# Patient Record
Sex: MALE | Race: WHITE | NOT HISPANIC OR LATINO | Employment: PART TIME | URBAN - METROPOLITAN AREA
[De-identification: names, ages, dates, MRNs, and addresses within clinical notes are randomized per-mention and may not be internally consistent; named-entity substitution may affect disease eponyms.]

---

## 2020-10-26 ENCOUNTER — TELEPHONE (OUTPATIENT)
Dept: ENDOCRINOLOGY | Facility: CLINIC | Age: 65
End: 2020-10-26

## 2020-10-26 NOTE — TELEPHONE ENCOUNTER
Returned call-he doesn't need to fast before appt tomorrow according to note in centricity.  He had labs with his pcp in march

## 2020-10-26 NOTE — TELEPHONE ENCOUNTER
Pt has an appointment tomorrow and wanted to know if he needed to fast before his appt He ask that someone reach out to him [phone]

## 2020-10-27 ENCOUNTER — OFFICE VISIT (OUTPATIENT)
Dept: ENDOCRINOLOGY | Facility: CLINIC | Age: 65
End: 2020-10-27

## 2020-10-27 VITALS
BODY MASS INDEX: 24.47 KG/M2 | HEIGHT: 71 IN | SYSTOLIC BLOOD PRESSURE: 100 MMHG | DIASTOLIC BLOOD PRESSURE: 70 MMHG | HEART RATE: 72 BPM | WEIGHT: 174.8 LBS

## 2020-10-27 DIAGNOSIS — E11.9 TYPE 2 DIABETES MELLITUS WITHOUT COMPLICATION, WITH LONG-TERM CURRENT USE OF INSULIN (HCC): Primary | ICD-10-CM

## 2020-10-27 DIAGNOSIS — E78.00 PURE HYPERCHOLESTEROLEMIA: ICD-10-CM

## 2020-10-27 DIAGNOSIS — Z79.4 TYPE 2 DIABETES MELLITUS WITHOUT COMPLICATION, WITH LONG-TERM CURRENT USE OF INSULIN (HCC): Primary | ICD-10-CM

## 2020-10-27 LAB
EXPIRATION DATE: NORMAL
HBA1C MFR BLD: 5.4 %
Lab: NORMAL

## 2020-10-27 PROCEDURE — 83036 HEMOGLOBIN GLYCOSYLATED A1C: CPT | Performed by: PHYSICIAN ASSISTANT

## 2020-10-27 PROCEDURE — 99213 OFFICE O/P EST LOW 20 MIN: CPT | Performed by: PHYSICIAN ASSISTANT

## 2020-10-27 RX ORDER — GLIPIZIDE 10 MG/1
TABLET, FILM COATED, EXTENDED RELEASE ORAL DAILY
COMMUNITY
Start: 2020-10-13 | End: 2020-10-27

## 2020-10-27 RX ORDER — INSULIN GLARGINE 100 [IU]/ML
INJECTION, SOLUTION SUBCUTANEOUS
COMMUNITY
Start: 2020-08-13 | End: 2020-12-14 | Stop reason: SDUPTHER

## 2020-10-27 RX ORDER — SEMAGLUTIDE 1.34 MG/ML
0.5 INJECTION, SOLUTION SUBCUTANEOUS WEEKLY
COMMUNITY
Start: 2020-10-14 | End: 2021-02-23 | Stop reason: SDUPTHER

## 2020-10-27 RX ORDER — ASPIRIN 81 MG/1
81 TABLET ORAL DAILY
COMMUNITY

## 2020-10-27 RX ORDER — DAPAGLIFLOZIN 10 MG/1
10 TABLET, FILM COATED ORAL DAILY
COMMUNITY
Start: 2020-10-26 | End: 2021-02-23 | Stop reason: SDUPTHER

## 2020-10-27 RX ORDER — ATORVASTATIN CALCIUM 20 MG/1
20 TABLET, FILM COATED ORAL DAILY
COMMUNITY
Start: 2020-10-13 | End: 2021-02-23 | Stop reason: SDUPTHER

## 2020-10-27 NOTE — PROGRESS NOTES
"     Office Note      Date: 10/27/2020  Patient Name: Diogenes Schneider  MRN: [id number]  : 1955    Chief Complaint   Patient presents with   • Diabetes       History of Present Illness:   Diogenes Schneider is a 65 y.o. male who presents today for type 2 diabetes.  He remains on Basaglar, Ozempic, metformin, Farxiga, and glipizide. He reports  tolerating meds well. He reports testing blood sugar on occasion.  Fasting readings often in the 70s.  FSBS was 70 this morning.  He denies frequent or severe hypoglycemia. He reports feeling very tired in the morning, but then this improves some during the day. He denies any problems with his feet.  Eye exam up-to-date He remains on statin and ASA.    Subjective      Review of Systems:   Review of Systems   Constitutional: Positive for fatigue. Negative for appetite change, chills, fever and unexpected weight change.   Respiratory: Negative for cough, shortness of breath and wheezing.    Cardiovascular: Negative for chest pain, palpitations and leg swelling.   Gastrointestinal: Negative for abdominal pain, constipation, diarrhea, nausea and vomiting.   Endocrine: Negative for cold intolerance, heat intolerance, polydipsia, polyphagia and polyuria.   Neurological: Negative for tremors, syncope, weakness, numbness and headaches.       The following portions of the patient's history were reviewed and updated as appropriate: allergies, current medications, past family history, past medical history, past social history, past surgical history and problem list.    Objective     Vitals:    10/27/20 0952   BP: 100/70   BP Location: Left arm   Patient Position: Sitting   Cuff Size: Adult   Pulse: 72   Weight: 79.3 kg (174 lb 12.8 oz)   Height: 180.3 cm (71\")   PainSc: 0-No pain     Body mass index is 24.38 kg/m².    Physical Exam  Vitals signs reviewed.   Constitutional:       General: He is not in acute distress.     Appearance: Normal appearance.   Cardiovascular:      Rate and " Rhythm: Normal rate and regular rhythm.      Heart sounds: S1 normal and S2 normal. No murmur. No friction rub. No gallop.    Neurological:      Mental Status: He is alert.   Psychiatric:         Mood and Affect: Mood and affect normal.       HEMOGLOBIN A1C  Lab Results   Component Value Date    HGBA1C 5.4 10/27/2020       Current Outpatient Medications   Medication Instructions   • aspirin 81 mg, Oral, Daily   • atorvastatin (LIPITOR) 20 mg, Oral, Daily   • Farxiga 10 mg, Oral, Daily   • Insulin Glargine (BASAGLAR KWIKPEN) 100 UNIT/ML injection pen INJECT 44 UNITS SUBCUTANEOUSLY AT BEDTIME AND TITRATE TO FASTING BLOOD SUGAR LESS THAN 120   • metFORMIN (GLUCOPHAGE) 1,000 mg, Oral, 2 times daily   • Ozempic (0.25 or 0.5 MG/DOSE) 0.5 mg, Subcutaneous, Weekly       Assessment / Plan      Assessment & Plan:  1. Type 2 diabetes mellitus without complication, with long-term current use of insulin (CMS/[facility])  A1c looks good, but lower than it needs to be.  Fasting blood sugars on the low side.  Will stop glipizide and continue basal insulin along with other meds.  Reviewed blood sugar goals.  Encouraged to increase fluids (water) to stay hydrated.  He will follow up with PCP if he continues to feel tired.  - POC Glycosylated Hemoglobin (Hb A1C)    2. Pure hypercholesterolemia  Continue statin.      Return in about 3 months (around 1/27/2021) for Next scheduled follow up.     JOSÉ MIGUEL Tamez  10/27/2020

## 2020-11-30 RX ORDER — BLOOD SUGAR DIAGNOSTIC
STRIP MISCELLANEOUS
Qty: 100 EACH | Refills: 3 | Status: SHIPPED | OUTPATIENT
Start: 2020-11-30 | End: 2021-12-13 | Stop reason: SDUPTHER

## 2020-11-30 NOTE — TELEPHONE ENCOUNTER
PLEASE CALL IN TODAY HE IS RUNNING LOW ON HIS ONE TOUCH VENO TEST STRIPS    PLEASE CALL TO [facility]     PTS NUMBER IF YOU HAVE QUESTIONS [phone]

## 2020-12-14 RX ORDER — INSULIN GLARGINE 100 [IU]/ML
40 INJECTION, SOLUTION SUBCUTANEOUS NIGHTLY
Qty: 15 ML | Refills: 5 | Status: SHIPPED | OUTPATIENT
Start: 2020-12-14 | End: 2021-05-28 | Stop reason: SDUPTHER

## 2021-02-23 ENCOUNTER — LAB (OUTPATIENT)
Dept: LAB | Facility: HOSPITAL | Age: 66
End: 2021-02-23

## 2021-02-23 ENCOUNTER — OFFICE VISIT (OUTPATIENT)
Dept: ENDOCRINOLOGY | Facility: CLINIC | Age: 66
End: 2021-02-23

## 2021-02-23 VITALS
OXYGEN SATURATION: 98 % | HEIGHT: 71 IN | HEART RATE: 74 BPM | SYSTOLIC BLOOD PRESSURE: 118 MMHG | BODY MASS INDEX: 23.94 KG/M2 | WEIGHT: 171 LBS | DIASTOLIC BLOOD PRESSURE: 68 MMHG | TEMPERATURE: 97.1 F

## 2021-02-23 DIAGNOSIS — E11.9 TYPE 2 DIABETES MELLITUS WITHOUT COMPLICATION, WITH LONG-TERM CURRENT USE OF INSULIN (HCC): Primary | Chronic | ICD-10-CM

## 2021-02-23 DIAGNOSIS — Z79.4 TYPE 2 DIABETES MELLITUS WITHOUT COMPLICATION, WITH LONG-TERM CURRENT USE OF INSULIN (HCC): Primary | Chronic | ICD-10-CM

## 2021-02-23 DIAGNOSIS — E78.00 PURE HYPERCHOLESTEROLEMIA: Chronic | ICD-10-CM

## 2021-02-23 LAB
ALBUMIN SERPL-MCNC: 4.5 G/DL (ref 3.5–5.2)
ALBUMIN UR-MCNC: <1.2 MG/DL
ALBUMIN/GLOB SERPL: 1.9 G/DL
ALP SERPL-CCNC: 70 U/L (ref 39–117)
ALT SERPL W P-5'-P-CCNC: 14 U/L (ref 1–41)
ANION GAP SERPL CALCULATED.3IONS-SCNC: 12.8 MMOL/L (ref 5–15)
AST SERPL-CCNC: 20 U/L (ref 1–40)
BILIRUB SERPL-MCNC: 1.7 MG/DL (ref 0–1.2)
BUN SERPL-MCNC: 10 MG/DL (ref 8–23)
BUN/CREAT SERPL: 11.9 (ref 7–25)
CALCIUM SPEC-SCNC: 9.8 MG/DL (ref 8.6–10.5)
CHLORIDE SERPL-SCNC: 105 MMOL/L (ref 98–107)
CHOLEST SERPL-MCNC: 96 MG/DL (ref 0–200)
CO2 SERPL-SCNC: 24.2 MMOL/L (ref 22–29)
CREAT SERPL-MCNC: 0.84 MG/DL (ref 0.76–1.27)
CREAT UR-MCNC: 171.5 MG/DL
EXPIRATION DATE: NORMAL
GFR SERPL CREATININE-BSD FRML MDRD: 92 ML/MIN/1.73
GLOBULIN UR ELPH-MCNC: 2.4 GM/DL
GLUCOSE SERPL-MCNC: 74 MG/DL (ref 65–99)
HBA1C MFR BLD: 6.2 %
HDLC SERPL-MCNC: 40 MG/DL (ref 40–60)
LDLC SERPL CALC-MCNC: 39 MG/DL (ref 0–100)
LDLC/HDLC SERPL: 0.96 {RATIO}
Lab: NORMAL
MICROALBUMIN/CREAT UR: NORMAL MG/G{CREAT}
POTASSIUM SERPL-SCNC: 4.2 MMOL/L (ref 3.5–5.2)
PROT SERPL-MCNC: 6.9 G/DL (ref 6–8.5)
SODIUM SERPL-SCNC: 142 MMOL/L (ref 136–145)
TRIGL SERPL-MCNC: 88 MG/DL (ref 0–150)
TSH SERPL DL<=0.05 MIU/L-ACNC: 1.98 UIU/ML (ref 0.27–4.2)
VLDLC SERPL-MCNC: 17 MG/DL (ref 5–40)

## 2021-02-23 PROCEDURE — 99214 OFFICE O/P EST MOD 30 MIN: CPT | Performed by: PHYSICIAN ASSISTANT

## 2021-02-23 PROCEDURE — 82570 ASSAY OF URINE CREATININE: CPT | Performed by: PHYSICIAN ASSISTANT

## 2021-02-23 PROCEDURE — 80061 LIPID PANEL: CPT | Performed by: PHYSICIAN ASSISTANT

## 2021-02-23 PROCEDURE — 82043 UR ALBUMIN QUANTITATIVE: CPT | Performed by: PHYSICIAN ASSISTANT

## 2021-02-23 PROCEDURE — 83036 HEMOGLOBIN GLYCOSYLATED A1C: CPT | Performed by: PHYSICIAN ASSISTANT

## 2021-02-23 PROCEDURE — 80053 COMPREHEN METABOLIC PANEL: CPT | Performed by: PHYSICIAN ASSISTANT

## 2021-02-23 PROCEDURE — 84443 ASSAY THYROID STIM HORMONE: CPT | Performed by: PHYSICIAN ASSISTANT

## 2021-02-23 RX ORDER — DAPAGLIFLOZIN 10 MG/1
10 TABLET, FILM COATED ORAL DAILY
Qty: 30 TABLET | Refills: 11 | Status: SHIPPED | OUTPATIENT
Start: 2021-02-23 | End: 2021-12-13 | Stop reason: SDUPTHER

## 2021-02-23 RX ORDER — SEMAGLUTIDE 1.34 MG/ML
0.5 INJECTION, SOLUTION SUBCUTANEOUS WEEKLY
Qty: 1.5 ML | Refills: 11 | Status: SHIPPED | OUTPATIENT
Start: 2021-02-23 | End: 2021-12-13 | Stop reason: SDUPTHER

## 2021-02-23 RX ORDER — ATORVASTATIN CALCIUM 20 MG/1
20 TABLET, FILM COATED ORAL DAILY
Qty: 30 TABLET | Refills: 11 | Status: SHIPPED | OUTPATIENT
Start: 2021-02-23 | End: 2021-12-13 | Stop reason: SDUPTHER

## 2021-02-23 NOTE — PROGRESS NOTES
"     Office Note      Date: 2021  Patient Name: Diogenes Schneider  MRN: [id number]  : 1955    Chief Complaint   Patient presents with   • Diabetes       History of Present Illness:   Diogenes Schneider is a 65 y.o. male who presents today for type 2 diabetes.   We stopped glipizide at the last visit.  He remains on Basaglar, Ozempic, metformin, and Farxiga.  He reports tolerating medications well.  Fasting FSBS are typically 80s-90s.  He denies any hypoglycemia.  He reports mild depression this winter.  He is looking forward to spring.  He denies any problems with his feet.   Eye exam up to date.  He remains on statin and ASA.    Subjective      Review of Systems:   Review of Systems   Psychiatric/Behavioral: Positive for dysphoric mood.   All other systems reviewed and are negative.      The following portions of the patient's history were reviewed and updated as appropriate: allergies, current medications, past family history, past medical history, past social history, past surgical history and problem list.    Objective     Vitals:    21 0836   BP: 118/68   BP Location: Right arm   Patient Position: Sitting   Cuff Size: Adult   Pulse: 74   Temp: 97.1 °F (36.2 °C)   TempSrc: Infrared   SpO2: 98%   Weight: 77.6 kg (171 lb)   Height: 180.3 cm (71\")   PainSc: 0-No pain     Body mass index is 23.85 kg/m².    Physical Exam  Vitals signs reviewed.   Constitutional:       General: He is not in acute distress.  Cardiovascular:      Pulses:           Dorsalis pedis pulses are 2+ on the right side and 2+ on the left side.        Posterior tibial pulses are 2+ on the right side and 2+ on the left side.   Musculoskeletal:      Right foot: No deformity.      Left foot: No deformity.   Feet:      Right foot:      Protective Sensation: 10 sites tested. 10 sites sensed.      Skin integrity: Skin integrity normal.      Toenail Condition: Right toenails are normal.      Left foot:      Protective Sensation: 10 sites " tested. 10 sites sensed.      Skin integrity: Skin integrity normal.      Toenail Condition: Left toenails are normal.   Neurological:      Mental Status: He is alert and oriented to person, place, and time.   Psychiatric:         Mood and Affect: Mood and affect normal.       HEMOGLOBIN A1C  Lab Results   Component Value Date    HGBA1C 6.2 02/23/2021       Current Outpatient Medications   Medication Instructions   • aspirin 81 mg, Oral, Daily   • atorvastatin (LIPITOR) 20 mg, Oral, Daily   • Farxiga 10 mg, Oral, Daily   • Insulin Glargine (BASAGLAR KWIKPEN) 40 Units, Subcutaneous, Nightly   • metFORMIN (GLUCOPHAGE) 1,000 mg, Oral, 2 times daily   • OneTouch Verio test strip Use to test blood sugar once per day; E11.9   • Ozempic (0.25 or 0.5 MG/DOSE) 0.5 mg, Subcutaneous, Weekly       Assessment / Plan      Assessment & Plan:  1. Type 2 diabetes mellitus without complication, with long-term current use of insulin (CMS/[facility])  A1c at goal.  No trouble with hypoglycemia.  Encouraged healthy dietary choices and regular exercise.  Continue Basaglar, Ozempic, Farxiga, and metformin.  Check labs today.  - POC Glycosylated Hemoglobin (Hb A1C)  - Comprehensive Metabolic Panel  - Microalbumin / Creatinine Urine Ratio - Urine, Clean Catch  - TSH    2. Pure hypercholesterolemia  Continue atorvastatin.  Check fasting lipids today.  - Lipid Panel    Refills were sent to his pharmacy.  Will notify him of pending lab results.    Return in about 3 months (around 5/23/2021) for Next scheduled follow up.     JOSÉ MIGUEL Tamez  02/23/2021

## 2021-03-01 ENCOUNTER — TELEPHONE (OUTPATIENT)
Dept: ENDOCRINOLOGY | Facility: CLINIC | Age: 66
End: 2021-03-01

## 2021-05-28 ENCOUNTER — OFFICE VISIT (OUTPATIENT)
Dept: ENDOCRINOLOGY | Facility: CLINIC | Age: 66
End: 2021-05-28

## 2021-05-28 VITALS
WEIGHT: 166 LBS | DIASTOLIC BLOOD PRESSURE: 70 MMHG | HEART RATE: 64 BPM | SYSTOLIC BLOOD PRESSURE: 124 MMHG | OXYGEN SATURATION: 98 % | BODY MASS INDEX: 23.24 KG/M2 | HEIGHT: 71 IN

## 2021-05-28 DIAGNOSIS — E11.9 TYPE 2 DIABETES MELLITUS WITHOUT COMPLICATION, WITH LONG-TERM CURRENT USE OF INSULIN (HCC): Primary | Chronic | ICD-10-CM

## 2021-05-28 DIAGNOSIS — Z79.4 TYPE 2 DIABETES MELLITUS WITHOUT COMPLICATION, WITH LONG-TERM CURRENT USE OF INSULIN (HCC): Primary | Chronic | ICD-10-CM

## 2021-05-28 LAB
EXPIRATION DATE: NORMAL
EXPIRATION DATE: NORMAL
GLUCOSE BLDC GLUCOMTR-MCNC: 74 MG/DL (ref 70–130)
HBA1C MFR BLD: 6.2 %
Lab: NORMAL
Lab: NORMAL

## 2021-05-28 PROCEDURE — 82947 ASSAY GLUCOSE BLOOD QUANT: CPT | Performed by: PHYSICIAN ASSISTANT

## 2021-05-28 PROCEDURE — 99213 OFFICE O/P EST LOW 20 MIN: CPT | Performed by: PHYSICIAN ASSISTANT

## 2021-05-28 PROCEDURE — 83036 HEMOGLOBIN GLYCOSYLATED A1C: CPT | Performed by: PHYSICIAN ASSISTANT

## 2021-05-28 RX ORDER — INSULIN GLARGINE 100 [IU]/ML
40 INJECTION, SOLUTION SUBCUTANEOUS NIGHTLY
Qty: 15 ML | Refills: 5 | Status: SHIPPED | OUTPATIENT
Start: 2021-05-28 | End: 2021-06-29

## 2021-05-28 NOTE — PROGRESS NOTES
"     Office Note      Date: 2021  Patient Name: Diogenes Schneider  MRN: [id number]  : 1955    Chief Complaint   Patient presents with   • Diabetes       History of Present Illness:   Diogenes Schneider is a 65 y.o. male who presents today for type 2 diabetes.   He remains on Basaglar, Ozempic, metformin, and Farxiga.  Tolerating medications well.  He has been testing blood sugars 2 times per day in the past 2 weeks, but typically testing fasting glucose once every other day.  Fasting blood sugars have been <100.  He denies any hypoglycemia, occasional readings in the 70s.  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  Feet - no problems, exam up to date.  Eye exam up to date.  He remains on statin and aspirin.      Subjective      Review of Systems:   Review of Systems   Constitutional: Negative.    Cardiovascular: Negative.    Gastrointestinal: Negative.    Endocrine: Negative.        The following portions of the patient's history were reviewed and updated as appropriate: allergies, current medications, past family history, past medical history, past social history, past surgical history and problem list.    Objective     Vitals:    21 0935   BP: 124/70   BP Location: Left arm   Patient Position: Sitting   Cuff Size: Adult   Pulse: 64   SpO2: 98%   Weight: 75.3 kg (166 lb)   Height: 180.3 cm (71\")   PainSc: 0-No pain     Body mass index is 23.15 kg/m².    Physical Exam  Vitals reviewed.   Constitutional:       General: He is not in acute distress.  Neurological:      Mental Status: He is alert and oriented to person, place, and time.   Psychiatric:         Mood and Affect: Affect normal.         HEMOGLOBIN A1C  Lab Results   Component Value Date    HGBA1C 6.2 2021    HGBA1C 6.2 2021    HGBA1C 5.4 10/27/2020     GLUCOSE  Lab Results   Component Value Date    POCGLU 74 2021     CMP  Lab Results   Component Value Date    BUN 10 2021    CREATININE 0.84 2021    EGFRIFNONA 92 " 02/23/2021    BCR 11.9 02/23/2021    K 4.2 02/23/2021    CO2 24.2 02/23/2021    CALCIUM 9.8 02/23/2021    ALBUMIN 4.50 02/23/2021    BILITOT 1.7 (H) 02/23/2021    ALKPHOS 70 02/23/2021    AST 20 02/23/2021    ALT 14 02/23/2021     LIPID PANEL  Lab Results   Component Value Date    CHOL 96 02/23/2021    TRIG 88 02/23/2021    HDL 40 02/23/2021    LDL 39 02/23/2021     URINE MICROALBUMIN/CREATININE RATIO  Lab Results   Component Value Date    MALBCRERATIO  02/23/2021      Comment:      Unable to calculate     THYROID  Lab Results   Component Value Date    TSH 1.980 02/23/2021       Current Outpatient Medications   Medication Instructions   • aspirin 81 mg, Oral, Daily   • atorvastatin (LIPITOR) 20 mg, Oral, Daily   • BASAGLAR KWIKPEN 40 Units, Subcutaneous, Nightly   • Farxiga 10 mg, Oral, Daily   • metFORMIN (GLUCOPHAGE) 1,000 mg, Oral, 2 times daily   • OneTouch Verio test strip Use to test blood sugar once per day; E11.9   • Ozempic (0.25 or 0.5 MG/DOSE) 0.5 mg, Subcutaneous, Weekly       Assessment / Plan      Assessment & Plan:  1. Type 2 diabetes mellitus without complication, with long-term current use of insulin (CMS/[facility])  A1c at goal.  He will decrease basal insulin by 3 units if fasting FSBG <80.  Continue Basaglar, metformin, Farxiga, and Ozempic.  - POC Glycosylated Hemoglobin (Hb A1C)  - POC Glucose, Blood      Return in about 3 months (around 8/28/2021) for Next scheduled follow up.     JOSÉ MIGUEL Tamez  Endocrinology  05/28/2021

## 2021-06-29 ENCOUNTER — TELEPHONE (OUTPATIENT)
Dept: ENDOCRINOLOGY | Facility: CLINIC | Age: 66
End: 2021-06-29

## 2021-06-29 DIAGNOSIS — Z79.4 TYPE 2 DIABETES MELLITUS WITHOUT COMPLICATION, WITH LONG-TERM CURRENT USE OF INSULIN (HCC): Primary | ICD-10-CM

## 2021-06-29 DIAGNOSIS — E11.9 TYPE 2 DIABETES MELLITUS WITHOUT COMPLICATION, WITH LONG-TERM CURRENT USE OF INSULIN (HCC): Primary | ICD-10-CM

## 2021-06-29 RX ORDER — INSULIN GLARGINE 300 U/ML
40 INJECTION, SOLUTION SUBCUTANEOUS NIGHTLY
Qty: 4.5 ML | Refills: 5 | Status: SHIPPED | OUTPATIENT
Start: 2021-06-29 | End: 2021-12-13 | Stop reason: SDUPTHER

## 2021-06-29 NOTE — TELEPHONE ENCOUNTER
Pt called states his insurance no longer covers Basaglar kwikpen 100 unit/Ml injection pen. Insurance now covers Lantus solostar, Levemir, Tresiba and  Toujeo. Pt states he has about a month of Basaglar for a month. Please send in new prescription. Pt last seen 05/28/21 pt next appt 09/07/21

## 2021-09-07 ENCOUNTER — OFFICE VISIT (OUTPATIENT)
Dept: ENDOCRINOLOGY | Facility: CLINIC | Age: 66
End: 2021-09-07

## 2021-09-07 VITALS
HEIGHT: 71 IN | OXYGEN SATURATION: 98 % | WEIGHT: 169 LBS | DIASTOLIC BLOOD PRESSURE: 78 MMHG | SYSTOLIC BLOOD PRESSURE: 128 MMHG | HEART RATE: 65 BPM | BODY MASS INDEX: 23.66 KG/M2

## 2021-09-07 DIAGNOSIS — E11.9 TYPE 2 DIABETES MELLITUS WITHOUT COMPLICATION, WITH LONG-TERM CURRENT USE OF INSULIN (HCC): Primary | Chronic | ICD-10-CM

## 2021-09-07 DIAGNOSIS — Z79.4 TYPE 2 DIABETES MELLITUS WITHOUT COMPLICATION, WITH LONG-TERM CURRENT USE OF INSULIN (HCC): Primary | Chronic | ICD-10-CM

## 2021-09-07 LAB
EXPIRATION DATE: NORMAL
EXPIRATION DATE: NORMAL
GLUCOSE BLDC GLUCOMTR-MCNC: 110 MG/DL (ref 70–130)
HBA1C MFR BLD: 6.4 %
Lab: NORMAL
Lab: NORMAL

## 2021-09-07 PROCEDURE — 3044F HG A1C LEVEL LT 7.0%: CPT | Performed by: PHYSICIAN ASSISTANT

## 2021-09-07 PROCEDURE — 99213 OFFICE O/P EST LOW 20 MIN: CPT | Performed by: PHYSICIAN ASSISTANT

## 2021-09-07 PROCEDURE — 83036 HEMOGLOBIN GLYCOSYLATED A1C: CPT | Performed by: PHYSICIAN ASSISTANT

## 2021-09-07 PROCEDURE — 82947 ASSAY GLUCOSE BLOOD QUANT: CPT | Performed by: PHYSICIAN ASSISTANT

## 2021-09-07 NOTE — PROGRESS NOTES
"     Office Note      Date: 2021  Patient Name: Diogenes Schneider  MRN: [id number]  : 1955    Chief Complaint   Patient presents with   • Diabetes       History of Present Illness:   Diogenes Schneider is a 66 y.o. male who presents today for follow up on type 2 diabetes.  Known diabetic complications: none.  Current diabetic medications include Toujeo, Ozempic, metformin, and Farxiga. Basaglar was changed to Toujeo due to insurance.  He is now on Medicare.  Current monitoring regimen: home blood tests - 2 times weekly  Home blood sugar records: fasting range:   Any episodes of hypoglycemia? no  Feet: No sores or other issues.  Foot exam up to date.  Eye exam current (within one year): yes, 2020, appointment next week. No retinopathy without macular edema.  Weight trend: stable  Current diet: in general, a \"healthy\" diet    Current exercise: intermittent, working on farm; walking on treadmill in winter.  ACE inhibitor/ARB: No.  Statin: Yes, atorvastatin.    Subjective      Review of Systems:   Review of Systems   Constitutional: Negative.    Cardiovascular: Negative.    Gastrointestinal: Negative.    Endocrine: Negative.        The following portions of the patient's history were reviewed and updated as appropriate: allergies, current medications, past family history, past medical history, past social history, past surgical history and problem list.    Objective     Vitals:    21 0845   BP: 128/78   Pulse: 65   SpO2: 98%   Weight: 76.7 kg (169 lb)   Height: 180.3 cm (71\")   PainSc: 0-No pain     Body mass index is 23.57 kg/m².    Physical Exam  Vitals reviewed.   Constitutional:       General: He is not in acute distress.  Neurological:      Mental Status: He is alert and oriented to person, place, and time.   Psychiatric:         Mood and Affect: Affect normal.         HEMOGLOBIN A1C  Lab Results   Component Value Date    HGBA1C 6.4 2021    HGBA1C 6.2 2021    HGBA1C 6.2 2021 "     GLUCOSE  Lab Results   Component Value Date    POCGLU 110 09/07/2021     CMP  Lab Results   Component Value Date    GLUCOSE 74 02/23/2021    BUN 10 02/23/2021    CREATININE 0.84 02/23/2021    EGFRIFNONA 92 02/23/2021    BCR 11.9 02/23/2021     02/23/2021    K 4.2 02/23/2021    CO2 24.2 02/23/2021    CALCIUM 9.8 02/23/2021    ALBUMIN 4.50 02/23/2021    BILITOT 1.7 (H) 02/23/2021    ALKPHOS 70 02/23/2021    AST 20 02/23/2021    ALT 14 02/23/2021     LIPID PANEL  Lab Results   Component Value Date    CHOL 96 02/23/2021    TRIG 88 02/23/2021    HDL 40 02/23/2021    LDL 39 02/23/2021     URINE MICROALBUMIN/CREATININE RATIO  Lab Results   Component Value Date    MALBCRERATIO  02/23/2021      Comment:      Unable to calculate     THYROID  Lab Results   Component Value Date    TSH 1.980 02/23/2021       Current Outpatient Medications   Medication Instructions   • aspirin 81 mg, Oral, Daily   • atorvastatin (LIPITOR) 20 mg, Oral, Daily   • Farxiga 10 mg, Oral, Daily   • metFORMIN (GLUCOPHAGE) 1,000 mg, Oral, 2 times daily   • OneTouch Verio test strip Use to test blood sugar once per day; E11.9   • Ozempic (0.25 or 0.5 MG/DOSE) 0.5 mg, Subcutaneous, Weekly   • Toujeo SoloStar 40 Units, Subcutaneous, Nightly       Assessment / Plan      Assessment & Plan:  1. Type 2 diabetes mellitus without complication, with long-term current use of insulin (CMS/[facility])  A1c at goal.  Continue Toujeo, Ozempic, metformin, and Farxiga.  Continue healthy dietary choices.  Encouraged regular exercise.  BP okay.  - POC Glycosylated Hemoglobin (Hb A1C)  - POC Glucose, Blood      Return in about 3 months (around 12/7/2021) for next scheduled follow up.     JOSÉ MIGUEL Tamez  Endocrinology  09/07/2021

## 2021-12-13 ENCOUNTER — OFFICE VISIT (OUTPATIENT)
Dept: ENDOCRINOLOGY | Facility: CLINIC | Age: 66
End: 2021-12-13

## 2021-12-13 VITALS
WEIGHT: 169 LBS | HEIGHT: 71 IN | HEART RATE: 71 BPM | DIASTOLIC BLOOD PRESSURE: 70 MMHG | SYSTOLIC BLOOD PRESSURE: 128 MMHG | BODY MASS INDEX: 23.66 KG/M2 | OXYGEN SATURATION: 98 %

## 2021-12-13 DIAGNOSIS — Z79.4 TYPE 2 DIABETES MELLITUS WITH HYPERGLYCEMIA, WITH LONG-TERM CURRENT USE OF INSULIN (HCC): Primary | Chronic | ICD-10-CM

## 2021-12-13 DIAGNOSIS — E11.65 TYPE 2 DIABETES MELLITUS WITH HYPERGLYCEMIA, WITH LONG-TERM CURRENT USE OF INSULIN (HCC): Primary | Chronic | ICD-10-CM

## 2021-12-13 LAB
EXPIRATION DATE: NORMAL
EXPIRATION DATE: NORMAL
GLUCOSE BLDC GLUCOMTR-MCNC: 109 MG/DL (ref 70–130)
HBA1C MFR BLD: 7.2 %
Lab: NORMAL
Lab: NORMAL

## 2021-12-13 PROCEDURE — 83036 HEMOGLOBIN GLYCOSYLATED A1C: CPT | Performed by: PHYSICIAN ASSISTANT

## 2021-12-13 PROCEDURE — 3051F HG A1C>EQUAL 7.0%<8.0%: CPT | Performed by: PHYSICIAN ASSISTANT

## 2021-12-13 PROCEDURE — 99213 OFFICE O/P EST LOW 20 MIN: CPT | Performed by: PHYSICIAN ASSISTANT

## 2021-12-13 PROCEDURE — 82947 ASSAY GLUCOSE BLOOD QUANT: CPT | Performed by: PHYSICIAN ASSISTANT

## 2021-12-13 RX ORDER — BLOOD SUGAR DIAGNOSTIC
STRIP MISCELLANEOUS
Qty: 100 EACH | Refills: 3 | Status: SHIPPED | OUTPATIENT
Start: 2021-12-13 | End: 2022-03-30 | Stop reason: SDUPTHER

## 2021-12-13 RX ORDER — DAPAGLIFLOZIN 10 MG/1
10 TABLET, FILM COATED ORAL DAILY
Qty: 30 TABLET | Refills: 11 | Status: SHIPPED | OUTPATIENT
Start: 2021-12-13 | End: 2022-03-30 | Stop reason: SDUPTHER

## 2021-12-13 RX ORDER — INSULIN GLARGINE 300 U/ML
42 INJECTION, SOLUTION SUBCUTANEOUS NIGHTLY
Qty: 4.5 ML | Refills: 5 | Status: SHIPPED | OUTPATIENT
Start: 2021-12-13 | End: 2022-03-30 | Stop reason: SDUPTHER

## 2021-12-13 RX ORDER — ATORVASTATIN CALCIUM 20 MG/1
20 TABLET, FILM COATED ORAL DAILY
Qty: 30 TABLET | Refills: 11 | Status: SHIPPED | OUTPATIENT
Start: 2021-12-13 | End: 2022-03-30 | Stop reason: SDUPTHER

## 2021-12-13 RX ORDER — SEMAGLUTIDE 1.34 MG/ML
0.5 INJECTION, SOLUTION SUBCUTANEOUS WEEKLY
Qty: 1.5 ML | Refills: 11 | Status: SHIPPED | OUTPATIENT
Start: 2021-12-13 | End: 2022-03-30 | Stop reason: SDUPTHER

## 2021-12-13 NOTE — PROGRESS NOTES
"     Office Note      Date: 2021  Patient Name: Diogenes Schneider  MRN: [id number]  : 1955    Chief Complaint   Patient presents with   • Diabetes       History of Present Illness:   Diogenes Schneider is a 66 y.o. male who presents today for follow up on type 2 diabetes, diagnosed in .  Known diabetic complications: none.  Current diabetic medications include Toujeo, Ozempic, metformin, and Farxiga.  Current monitoring regimen: rarely testing.  Home blood sugar records: n/a  Any episodes of hypoglycemia? no  Feet: No sores or other issues. Foot exam up to date.  Eye exam current (within one year): yes, 11/15/2021. No retinopathy.  Weight trend: stable.  Current diet: in general, a \"healthy\" diet.  He reports that he has been eating frozen grapes frequently since last visit.  Current exercise: rarely, less active.  ACE inhibitor/ARB: No.  Statin: Yes, atorvastatin.  He and his wife are going on vacation next month to [place] for 2 weeks.      Subjective      Review of Systems:   Review of Systems   Constitutional: Negative.    Cardiovascular: Negative.    Gastrointestinal: Negative.    Endocrine: Negative.        The following portions of the patient's history were reviewed and updated as appropriate: allergies, current medications, past family history, past medical history, past social history, past surgical history and problem list.    Objective     Vitals:    21 0931   BP: 128/70   BP Location: Left arm   Patient Position: Sitting   Cuff Size: Adult   Pulse: 71   SpO2: 98%   Weight: 76.7 kg (169 lb)   Height: 180.3 cm (71\")   PainSc: 0-No pain     Body mass index is 23.57 kg/m².    Physical Exam  Vitals reviewed.   Constitutional:       General: He is not in acute distress.  Neurological:      Mental Status: He is alert and oriented to person, place, and time.   Psychiatric:         Mood and Affect: Affect normal.         HEMOGLOBIN A1C  Lab Results   Component Value Date    HGBA1C 7.2 " 12/13/2021    HGBA1C 6.4 09/07/2021    HGBA1C 6.2 05/28/2021     GLUCOSE  Lab Results   Component Value Date    POCGLU 109 12/13/2021     CMP  Lab Results   Component Value Date    GLUCOSE 74 02/23/2021    BUN 10 02/23/2021    CREATININE 0.84 02/23/2021    EGFRIFNONA 92 02/23/2021    BCR 11.9 02/23/2021     02/23/2021    K 4.2 02/23/2021    CO2 24.2 02/23/2021    CALCIUM 9.8 02/23/2021    ALBUMIN 4.50 02/23/2021    BILITOT 1.7 (H) 02/23/2021    ALKPHOS 70 02/23/2021    AST 20 02/23/2021    ALT 14 02/23/2021     LIPID PANEL  Lab Results   Component Value Date    CHOL 96 02/23/2021    TRIG 88 02/23/2021    HDL 40 02/23/2021    LDL 39 02/23/2021     URINE MICROALBUMIN/CREATININE RATIO  Lab Results   Component Value Date    MALBCRERATIO  02/23/2021      Comment:      Unable to calculate     THYROID  Lab Results   Component Value Date    TSH 1.980 02/23/2021       Current Outpatient Medications   Medication Instructions   • aspirin 81 mg, Oral, Daily   • atorvastatin (LIPITOR) 20 mg, Oral, Daily   • Farxiga 10 mg, Oral, Daily   • metFORMIN (GLUCOPHAGE) 1,000 mg, Oral, 2 Times Daily With Meals   • OneTouch Verio test strip Use to test blood sugar once per day; E11.9   • Ozempic (0.25 or 0.5 MG/DOSE) 0.5 mg, Subcutaneous, Weekly   • Toujeo SoloStar 42 Units, Subcutaneous, Nightly       Assessment / Plan      Assessment & Plan:  1. Type 2 diabetes mellitus with hyperglycemia, with long-term current use of insulin (HCC)  A1c increased, just above goal.  He has been significantly less active.  Encouraged regular exercise.  He will cut back on grapes as well.  Encouraged to start testing blood sugars again.  Continue Toujeo, Ozempic, Farxiga, and metformin.  BP okay.  - POC Glycosylated Hemoglobin (Hb A1C)  - POC Glucose, Blood      Return in about 3 months (around 3/13/2022) for fasting follow with A1c, lipids, CMP, microalbumin, TSH, foot exam. He was advised to contact the office with any interval questions or  concerns.    JOSÉ MIGUEL Tamez  Endocrinology  12/13/2021

## 2022-03-21 ENCOUNTER — DOCUMENTATION (OUTPATIENT)
Dept: ENDOCRINOLOGY | Facility: CLINIC | Age: 67
End: 2022-03-21

## 2022-03-21 NOTE — PROGRESS NOTES
Patient is eligible to fill with [facility].;i    Koffia JOÃO  Farcatarinaga- $55/ 30 days    $110/90 days  Ozempic- $30/ 30 days    $60/ 90 days    Radha Kaur CPhT  Pharmacy Care Coordinator  3/21/2022  10:35 EDT

## 2022-03-30 ENCOUNTER — SPECIALTY PHARMACY (OUTPATIENT)
Dept: ENDOCRINOLOGY | Facility: CLINIC | Age: 67
End: 2022-03-30

## 2022-03-30 ENCOUNTER — OFFICE VISIT (OUTPATIENT)
Dept: ENDOCRINOLOGY | Facility: CLINIC | Age: 67
End: 2022-03-30

## 2022-03-30 VITALS
SYSTOLIC BLOOD PRESSURE: 118 MMHG | BODY MASS INDEX: 23.52 KG/M2 | HEIGHT: 71 IN | OXYGEN SATURATION: 99 % | HEART RATE: 73 BPM | DIASTOLIC BLOOD PRESSURE: 79 MMHG | WEIGHT: 168 LBS

## 2022-03-30 DIAGNOSIS — E78.00 PURE HYPERCHOLESTEROLEMIA: Chronic | ICD-10-CM

## 2022-03-30 DIAGNOSIS — Z79.4 TYPE 2 DIABETES MELLITUS WITH HYPERGLYCEMIA, WITH LONG-TERM CURRENT USE OF INSULIN: Primary | Chronic | ICD-10-CM

## 2022-03-30 DIAGNOSIS — E11.65 TYPE 2 DIABETES MELLITUS WITH HYPERGLYCEMIA, WITH LONG-TERM CURRENT USE OF INSULIN: Primary | Chronic | ICD-10-CM

## 2022-03-30 LAB
EXPIRATION DATE: NORMAL
EXPIRATION DATE: NORMAL
GLUCOSE BLDC GLUCOMTR-MCNC: 99 MG/DL (ref 70–130)
HBA1C MFR BLD: 6.6 %
Lab: NORMAL
Lab: NORMAL

## 2022-03-30 PROCEDURE — 3044F HG A1C LEVEL LT 7.0%: CPT | Performed by: PHYSICIAN ASSISTANT

## 2022-03-30 PROCEDURE — 83036 HEMOGLOBIN GLYCOSYLATED A1C: CPT | Performed by: PHYSICIAN ASSISTANT

## 2022-03-30 PROCEDURE — 99214 OFFICE O/P EST MOD 30 MIN: CPT | Performed by: PHYSICIAN ASSISTANT

## 2022-03-30 PROCEDURE — 82947 ASSAY GLUCOSE BLOOD QUANT: CPT | Performed by: PHYSICIAN ASSISTANT

## 2022-03-30 RX ORDER — SEMAGLUTIDE 1.34 MG/ML
0.5 INJECTION, SOLUTION SUBCUTANEOUS WEEKLY
Qty: 4.5 ML | Refills: 3 | Status: SHIPPED | OUTPATIENT
Start: 2022-03-30 | End: 2023-03-01 | Stop reason: SDUPTHER

## 2022-03-30 RX ORDER — BLOOD SUGAR DIAGNOSTIC
STRIP MISCELLANEOUS
Qty: 100 EACH | Refills: 3 | Status: SHIPPED | OUTPATIENT
Start: 2022-03-30

## 2022-03-30 RX ORDER — DAPAGLIFLOZIN 10 MG/1
10 TABLET, FILM COATED ORAL DAILY
Qty: 90 TABLET | Refills: 3 | Status: SHIPPED | OUTPATIENT
Start: 2022-03-30 | End: 2023-03-01 | Stop reason: SDUPTHER

## 2022-03-30 RX ORDER — INSULIN GLARGINE 300 U/ML
42 INJECTION, SOLUTION SUBCUTANEOUS NIGHTLY
Qty: 13.5 ML | Refills: 3 | Status: SHIPPED | OUTPATIENT
Start: 2022-03-30 | End: 2023-03-01 | Stop reason: SDUPTHER

## 2022-03-30 RX ORDER — ATORVASTATIN CALCIUM 20 MG/1
20 TABLET, FILM COATED ORAL DAILY
Qty: 90 TABLET | Refills: 3 | Status: SHIPPED | OUTPATIENT
Start: 2022-03-30 | End: 2023-03-01 | Stop reason: SDUPTHER

## 2022-03-30 NOTE — PROGRESS NOTES
Specialty Pharmacy Patient Management Program  Endocrinology Initial Assessment     Diogenes Schneider is a 66 y.o. male with Type 2 Diabetes seen by an Endocrinology provider and enrolled in the Endocrinology Patient Management program offered by [facility].  An initial outreach was conducted, including assessment of therapy appropriateness and specialty medication education for FARXIGA, TOUJEO, and OZEMPIC. The patient was introduced to services offered by [facility], including: regular assessments, refill coordination, curbside pick-up or mail order delivery options, prior authorization maintenance, and financial assistance programs as applicable. The patient was also provided with contact information for the pharmacy team.     Insurance Coverage & Financial Support  [employer] Medicare D   Farxiga ($100/90d); Ozempic ($60/84d); Toujeo ($60/90d)    Relevant Past Medical History and Comorbidities  Relevant medical history and concomitant health conditions were discussed with the patient. The patient's chart has been reviewed for relevant past medical history and comorbid health conditions and updated as necessary.   Past Medical History:   Diagnosis Date   • Glaucoma    • Hypercholesterolemia    • Impotence    • Influenza vaccine needed    • Long term current use of insulin (HCC)    • Type 2 diabetes mellitus (HCC)      Social History     Socioeconomic History   • Marital status:    Tobacco Use   • Smoking status: Never Smoker   • Smokeless tobacco: Never Used   Vaping Use   • Vaping Use: Never used   Substance and Sexual Activity   • Alcohol use: Yes     Comment: rare   • Drug use: Never   • Sexual activity: Defer     Problem list reviewed by Zuleyma Rey RPH on 3/30/2022 at 10:48 AM    Allergies  Known allergies and reactions were discussed with the patient. The patient's chart has been reviewed for  allergy information and updated as necessary.   Patient has  no known allergies.    Allergies reviewed by Zuleyma Rey RPH on 3/30/2022 at 10:48 AM    Current Medication List  This medication list has been reviewed with the patient and evaluated for any interactions or necessary modifications/recommendations, and updated to include all prescription medications, OTC medications, and supplements the patient is currently taking.  This list reflects what is contained in the patient's profile, which has also been marked as reviewed to communicate to other providers it is the most up to date version of the patient's current medication therapy.     Current Outpatient Medications:   •  aspirin 81 MG EC tablet, Take 81 mg by mouth Daily., Disp: , Rfl:   •  atorvastatin (LIPITOR) 20 MG tablet, Take 1 tablet by mouth Daily., Disp: 30 tablet, Rfl: 11  •  Farxiga 10 MG tablet, Take 10 mg by mouth Daily., Disp: 30 tablet, Rfl: 11  •  metFORMIN (GLUCOPHAGE) 500 MG tablet, Take 2 tablets by mouth 2 (Two) Times a Day With Meals., Disp: 120 tablet, Rfl: 11  •  OneTouch Verio test strip, Use to test blood sugar once per day; E11.9, Disp: 100 each, Rfl: 3  •  Ozempic, 0.25 or 0.5 MG/DOSE, 2 MG/1.5ML solution pen-injector, Inject 0.5 mg under the skin into the appropriate area as directed 1 (One) Time Per Week., Disp: 1.5 mL, Rfl: 11  •  Toujeo SoloStar 300 UNIT/ML solution pen-injector injection, Inject 42 Units under the skin into the appropriate area as directed Every Night., Disp: 4.5 mL, Rfl: 5    Medicines reviewed by Zuleyma Rey RPH on 3/30/2022 at 10:48 AM    Drug Interactions  No Clinically Significant DDIs Noted at This Time on Medication Review    Recommended Medications Assessment  • Aspirin - Currently Taking  • Statin - Currently Taking  • ACEi/ARB - No    Relevant Laboratory Values  A1C Last 3 Results    HGBA1C Last 3 Results 9/7/21 12/13/21 3/30/22   Hemoglobin A1C 6.4 7.2 6.6           Lab Results   Component Value Date    HGBA1C 6.6 03/30/2022     Lab Results    Component Value Date    GLUCOSE 74 02/23/2021    CALCIUM 9.8 02/23/2021     02/23/2021    K 4.2 02/23/2021    CO2 24.2 02/23/2021     02/23/2021    BUN 10 02/23/2021    CREATININE 0.84 02/23/2021    EGFRIFNONA 92 02/23/2021    BCR 11.9 02/23/2021    ANIONGAP 12.8 02/23/2021     Lab Results   Component Value Date    CHOL 96 02/23/2021    TRIG 88 02/23/2021    HDL 40 02/23/2021    LDL 39 02/23/2021     Initial Education Provided for Specialty Medication  The patient has been provided with the following education and any applicable administration techniques (i.e. self-injection) have been demonstrated for the therapies indicated. All questions and concerns have been addressed prior to the patient receiving the medication, and the patient has verbalized understanding of the education and any materials provided.  Additional patient education shall be provided and documented upon request by the patient, provider or payer.      FARXIGA® (dapagliflozin)  Medication Expectations   Why am I taking this medication? You are taking Farxiga to lower blood sugar because you have type 2 diabetes. Diabetes is not curable but with proper medication and treatment, we can keep your blood sugar within your personalized target range. This medication also helps reduce the risk of progression of kidney disease, reduce the risk of death from heart attack or stroke, and reduce the risk of heart failure hospitalization.    What should I expect while on this medication? You should expect to see your blood sugar and A1c decrease over time. You may also see a decrease in your blood pressure and it can help some people lose weight.     How does the medication work? Farxiga works by helping to remove some sugar that the body doesn't need through urination.    How long will I be on this medication for? The amount of time you will be on this medication will be determined by your doctor based on blood sugar and A1c control. You will  most likely be on this medication or another diabetes medication throughout your lifetime. Do not abruptly stop this medication without talking to your doctor first.    How do I take this medication? Take as directed on your prescription label. This medication is usually taken in the morning and can be given with or without food.    What are some possible side effects? You may notice increased urination, especially when you first start Farxiga. Stuffy or runny nose can also occur. The most common side effects are urinary tract infections and yeast infections and are more commonly seen in females. Talk with your doctor if you notice white or yellow vaginal discharge, vaginal itching or odor of if you notice redness, itching, pain, or swelling of the penis and/or bad-smelling discharge from the penis.    What happens if I miss a dose? If you miss a dose, take it as soon as you remember. If it is close to your next dose, skip it (do not take 2 doses at once)     Medication Safety   What are things I should warn my doctor immediately about? Tell your doctor if you have kidney disease, liver disease, heart failure, pancreas problems, or history of frequent genital yeast or urinary tract infections. Tell your doctor if you are on a low-salt diet, if you drink alcohol, or if you are having surgery. Talk to your doctor if you are pregnant, planning to become pregnant, or breastfeeding. Also tell your doctor if you notice any signs/symptoms of an allergic reaction (rash, hives, difficulty breathing, etc.).   What are things that I should be cautious of? Be cautious of any side effects from this medication. Talk to your doctor if any new ones develop or aren't getting better.   What are some medications that can interact with this one? Some medications that interact include diuretics (water pills) and other medications that may also lower your blood sugar such as insulins and glipizide/glimepiride/glyburide. Your doctor may  reduce the dose of these medications when you start Farxiga to minimize low blood sugars. Always tell your doctor or pharmacist immediately if you start taking any new medications, including over-the-counter medications, vitamins, and herbal supplements.      Medication Storage/Handling   How should I handle this medication? Keep this medication out of reach of pets/children in tightly sealed container   How does this medication need to be stored? Store at room temperature and keep dry (don't keep in bathroom or other room with moisture)   How should I dispose of this medication? There should not be a need to dispose of this medication unless your provider decides to change the dose or therapy. If that is the case, take to your local police station for proper disposal. Some pharmacies also have take-back bins for medication drop-off.      Resources/Support   How can I remind myself to take this medication? You can download reminder apps to help you manage your refills. You may also set an alarm on your phone to remind you. The pharmacy carries pill boxes that you can place next to an area you pass everyday (such as where you place your car keys or where you charge your phone)   Is financial support available?  [employer] can provide co-pay cards if you have commercial insurance or patient assistance if you have Medicare or no insurance.    Which vaccines are recommended for me? Talk to your doctor about these vaccines: Flu, Coronavirus (COVID-19), Pneumococcal (pneumonia), Tdap, Hepatitis B, Zoster (shingles)      OZEMPIC® (semaglutide)  Medication Expectations   Why am I taking this medication? You are taking Ozempic, along with diet and exercise, to lower blood sugar because you have type 2 diabetes. Diabetes is not curable but with proper medication and treatment, we can keep your blood sugar within your personalized target range. This medication may also help you lose some weight, and it helps reduce the risk  of death from heart attack, and stroke in adults with type 2 diabetes and known heart disease.   What should I expect while on this medication? You should expect to see your blood sugar and A1c decrease over time and you may also lose some weight.   How does the medication work? Ozempic is a non-insulin injection that works with your body's own ability to lower blood sugar and A1c and helps your body release its own insulin in response to your blood sugar rising.  This medication also slows down food from leaving your stomach, making you feel fairchild for longer.   How long will I be on this medication for? The amount of time you will be on this medication will be determined by your doctor based on blood sugar and A1c control. You will most likely be on this medication or another diabetes medication throughout your lifetime. Do not abruptly stop this medication without talking to your doctor first.    How do I take this medication? Take as directed on your prescription label. Ozempic is injected under the skin (subcutaneously) of your stomach, thigh or upper arm.  Use this medication once weekly, on the same day each week, and it can be given with or without food.  Use a different injection site each week in the same body region.     For each new prefilled pen, prime the needle before the first injection by turning the dose selector to the flow check symbol and injecting into the air (priming is not required for subsequent injections).   • Once needle is inserted, continue to press the button until the dial has returned to 0 and for an additional 6 seconds before removing.    What are some possible side effects? You may notice you don't feel as hungry, especially when you first start using Ozempic.  The most common side effects are nausea, diarrhea, vomiting, stomach pain, and constipation.      Stop using Ozempic and call your doctor immediately if you have severe pain in your stomach area that will not go away as  this could be a sign of pancreatitis (inflammation of your pancreas).  Tell your doctor if you get a lump or swelling in your neck, hoarseness, difficulty swallowing, or feel short of breath (these may be symptoms of thyroid cancer).  Talk with your doctor if you have changes in vision while taking Ozempic.   What happens if I miss a dose? If you miss a dose, take it as soon as you remember as long as it is within 5 days after your missed dose.  If more than 5 days have passed, skip the missed dose and resume Ozempic on the regularly scheduled day.     Medication Safety   What are things I should warn my doctor immediately about? Do not use Ozempic if you or a family member have ever had medullary thyroid cancer (MTC) or Multiple Endocrine Neoplasia syndrome type 2 (MEN 2).    • Tell your doctor if you get a lump or swelling in your neck, hoarseness, difficulty swallowing, or feel short of breath (these may be symptoms of thyroid cancer).    Tell your doctor if you have or have had problems with your kidneys or pancreas.   • Stop using Ozempic and get medical help right away if you have severe pain in your stomach area that will not go away as this could be a sign of pancreatitis (inflammation of your pancreas)  Tell your doctor if you have problem digesting food or slowed emptying of your stomach (gastroparesis).    Let your doctor know if you have changes in vision while taking Ozempic or have been diagnosed with diabetic retinopathy.    Talk to your doctor if you are pregnant, planning to become pregnant, or breastfeeding.     Get medical help right away if you notice any signs/symptoms of an allergic reaction (rash, hives, difficulty breathing, etc.).   What are things that I should be cautious of? Be cautious of any side effects from this medication. Talk to your doctor if any new ones develop or aren't getting better.   What are some medications that can interact with this one? Taking Ozempic with other  medications that also lower your blood sugar such as insulin and glipizide/glimepiride/glyburide may increase the risk of low blood sugar.  • Your doctor may reduce the dose of these medications when you start Ozempic to minimize low blood sugars    It should not be taken with other medicines called GLP-1 receptor agonists, because these work the same way as Ozempic.      Because Ozempic slows stomach emptying, it can affect the way some medicines work.    Always tell your doctor or pharmacist immediately if you start taking any new medications, including over-the-counter medications, vitamins, and herbal supplements.     Medication Storage/Handling   How should I handle this medication? Keep this medication out of reach of pets/children and keep the pen capped when not in use.   How does this medication need to be stored? Store in the refrigerator prior to first use, but do not freeze.  After first use, you may continue to store in the refrigerator or at room temperature for 56 days.  Protect from excessive heat and sunlight.   How should I dispose of this medication? Used Ozempic pens should be thrown away after 56 days.  Place your used Ozempic pen and needle in an approved sharps container after use.  If you do not have a sharps container, you may use a household container made of heavy-duty plastic with a tight-fitting lid that is leak resistant (e.g., heavy-duty plastic laundry detergent bottle).    If your doctor decides to stop this medication, take to your local police station for proper disposal. Some pharmacies also have take-back bins for medication drop-off.      Resources/Support   How can I remind myself to take this medication? You can download reminder apps to help you manage your refills. You may also set an alarm on your phone to remind you.    Is financial support available?  [employer] can provide co-pay cards if you have commercial insurance or patient assistance if you have Medicare or no  insurance.    Which vaccines are recommended for me? Talk to your doctor about these vaccines:  • Flu   • Coronavirus (COVID-19)   • Pneumococcal (pneumonia)   • Tdap   • Hepatitis B   • Zoster (shingles)      TOUJEO® (insulin glargine)   Medication Expectations    Why am I taking this medication?  You are taking this medication to lower blood sugar and A1c because you have type 1 or type 2 diabetes. Diabetes is not curable but with proper medication and treatment, we can keep your blood sugar within your personalized target range.    What should I expect while on this medication?  You should expect to see your blood sugar and A1c decrease over time.    How does the medication work?  Toujeo is a long-acting insulin that slowly and continuously releases after administration with no peaks or wear-off between doses. Insulin helps the sugar in your blood get into cells and provide them with energy to fuel your body.     How long will I be on this medication for?  If you have Type 1 diabetes, you will be on this medication or another insulin throughout your lifetime because your body cannot make its own insulin. If you have Type 2 diabetes, the amount of time you will be on this medication will be determined by your doctor based on blood sugar and A1c control. You will most likely be on this medication or another diabetes medication throughout your lifetime because your body does not make enough insulin. Do not abruptly stop this medication without talking to your doctor first.     How do I take this medication?  Take as directed on your prescription label. Toujeo is usually given once daily and can be taken with or without food. Toujeo is injected under the skin (subcutaneously) of your stomach, thigh, buttocks, or upper arm. Use a different injection site in the same body region each day.       When using the FlexTouch pens, prime the needle before each injection by releasing 3 units of Toujeo insulin or 4 units of  Toujeo Max into the air. Then insert the needle under the skin and continue to press the button until the dial has returned to 0 and for an additional 5 seconds. Then remove the needle and discard. Use a new needle for each injection.   What are some possible side effects?  Toujeo may cause low blood sugar (hypoglycemia). Signs and symptoms that may indicate hypoglycemia include dizziness, sweating, anxiety, irritability, confusion, or headache. Toujeo may cause reactions or skin thickening at the injection site. Other side effects include high blood pressure, swelling of your hands and feet, diarrhea, flu symptoms, cough/runny nose, or muscle/limb/back pain.    What happens if I miss a dose?  If you miss a dose, take it as soon as your remember. If it is close to your next dose, skip it. Never take 2 doses at once.       Medication Safety    What are things I should warn my doctor immediately about?  Tell your doctor if you have kidney or liver problems. Talk to your doctor if you are pregnant, planning to become pregnant, or breastfeeding. Also tell your doctor if you notice any signs/symptoms of an allergic reaction (rash, hives, difficulty breathing, etc.).    What are things that I should be cautious of?  Be cautious of any side effects from this medication. Talk to your doctor if any new ones develop or aren't getting better.    What are some medications that can interact with this one?  Do not take this medication with rosiglitazone or macimorelin. Taking Toujeo with other medications that lower your blood sugar such as SGLT-2 inhibitors (Jardiance, Invokana, Farxiga, etc.), GLP-1 agonists (Ozempic, Trulicity, Victoza, etc.), glipizide/glimepiride/glyburide, pioglitazone, and others may increase the risk of low blood sugar. Your doctor may reduce the dose of these medications when you start Toujeo to minimize low blood sugars. Always tell your doctor or pharmacist immediately if you start taking any new  medications, including over-the-counter medications, vitamins, and herbal supplements.        Medication Storage/Handling    How should I handle this medication?  Keep this medication out of reach of pets/children and keep the pen capped when not in use. Do not share your medicine with others.     How does this medication need to be stored?  Store your new, unused pens in the original carton in the refrigerator. Protect it from light. Do not freeze. Store your opened pen at room temperature for up to 56 days (8 weeks), do not refrigerate. Always remove the needle and place the cap on the pen before storing.     How should I dispose of this medication?  Used Toujeo pens should be thrown away after 56 days even if insulin remains.?Place your used pen and needle in an approved sharps container. If you do not have a sharps container, you may use a household container made of heavy-duty plastic with a tight-fitting lid that is leak resistant (e.g.,?heavy-duty?plastic laundry detergent bottle).?If your doctor decides to stop this medication, take to your local police station for proper disposal. Some pharmacies also have?take-back bins for medication drop-off.??       Resources/Support    How can I remind myself to take this medication?  You can download reminder apps to help you manage your refills. You may also set an alarm on your phone to remind you.     Is financial support available?   [employer] can provide co-pay cards if you have commercial insurance or patient assistance if you have Medicare or no insurance.     Which vaccines are recommended for me?  Talk to your doctor about these vaccines: Flu, Coronavirus (COVID-19), Pneumococcal (pneumonia), Tdap, Hepatitis B, Zoster (shingles)         Adherence and Self-Administration  • Barriers to Patient Adherence and/or Self-Administration: None  • Methods for Supporting Patient Adherence and/or Self-Administration: Enrollment in     Goals of Therapy   Goals     •  Therapeutic/Clinical Goal      Maintain A1C Below 7%    3/30/22:  A1c in office today was 6.6% - patient adherent to medication therapies and understands importance of adherence. On track. KL             Reassessment Plan & Follow-Up  1. Medication Therapy Changes: No Changes Made Today. Diabetes is well-controlled on current treatment regimen.   2. Additional Plans, Therapy Recommendations, or Therapy Problems to Be Addressed: None  3. Pharmacist to perform regular reassessments no more than (6) months from the previous assessment.  4. Welcome information and patient satisfaction survey to be sent by retail team with patient's initial fill.  5. Care Coordinator to set up future refill outreaches, coordinate prescription delivery, and escalate clinical questions to pharmacist.     Attestation  I attest that the initiated specialty medication(s) are appropriate for the patient based on my assessment.  If the prescribed therapy is at any point deemed not appropriate based on the current or future assessments, a consultation will be initiated with the patient's specialty care provider to determine the best course of action. The revised plan of therapy will be documented along with any additional patient education provided.     Zuleyma Rey, PharmD   3/30/2022  11:06 EDT

## 2022-03-30 NOTE — TELEPHONE ENCOUNTER
Patient wishes to begin filling prescriptions with [facility]. Pending prescription renewals for [facility] to review/approve. Will call patient's previous pharmacy to cancel any currently active prescriptions there.     Zuleyma Rey, PharmD  3/30/2022  10:37 EDT

## 2022-04-04 ENCOUNTER — SPECIALTY PHARMACY (OUTPATIENT)
Dept: ENDOCRINOLOGY | Facility: CLINIC | Age: 67
End: 2022-04-04

## 2022-04-04 NOTE — PROGRESS NOTES
Specialty Pharmacy Patient Management Program  Endocrinology Refill Outreach      Diogenes is a 66 y.o. male contacted today regarding refills of his specialty medication(s).    Specialty medication(s) and dose(s) confirmed: Ozempic  Other medications being refilled: None    Refill Questions    Flowsheet Row Most Recent Value   Changes to allergies? No   Changes to medications? No   New conditions since last clinic visit No   Unplanned office visit, urgent care, ED, or hospital admission in the last 4 weeks  No   How does patient/caregiver feel medication is working? Very good   Financial problems or insurance changes  No   If yes, describe changes in insurance or financial issues. N/A   How many doses of your specialty medications were missed in the last 4 weeks? 0   Why were doses missed? N/A   Does this patient require a clinical escalation to a pharmacist? No          Delivery Questions    Flowsheet Row Most Recent Value   Delivery method [place] Postal Service   Delivery address correct? Yes   Preferred delivery time? Anytime   Number of medications in delivery 1   Medication being filled and delivered Ozempic   Doses left of specialty medications Toujeo (1-1.5 Weeks)   Is there any medication that is due not being filled? No   Supplies needed? No supplies needed   Cooler needed? Yes   Do any medications need mixed or dated? No   Copay form of payment Credit card on file   Questions or concerns for the pharmacist? No   Are any medications first time fills? No          Follow-up: Approx 1 week for Anusha Rey, PharmD  4/4/2022  08:57 EDT

## 2022-04-12 ENCOUNTER — SPECIALTY PHARMACY (OUTPATIENT)
Dept: ENDOCRINOLOGY | Facility: CLINIC | Age: 67
End: 2022-04-12

## 2022-04-12 NOTE — PROGRESS NOTES
Specialty Pharmacy Patient Management Program  Endocrinology Refill Outreach      Diogenes is a 66 y.o. male contacted today regarding refills of his specialty medication(s).    Specialty medication(s) and dose(s) confirmed: Toujeo   Other medications being refilled: Metformin     Refill Questions    Flowsheet Row Most Recent Value   Changes to allergies? No   Changes to medications? No   New conditions since last clinic visit No   Unplanned office visit, urgent care, ED, or hospital admission in the last 4 weeks  No   How does patient/caregiver feel medication is working? Excellent   Financial problems or insurance changes  No   If yes, describe changes in insurance or financial issues. N/A   How many doses of your specialty medications were missed in the last 4 weeks? 0   Why were doses missed? N/A   Does this patient require a clinical escalation to a pharmacist? No          Delivery Questions    Flowsheet Row Most Recent Value   Delivery method FedEx   Delivery address correct? Yes   Preferred delivery time? Anytime   Number of medications in delivery 2   Medication being filled and delivered Toujeo, Metformin   Doses left of specialty medications ~3 days   Is there any medication that is due not being filled? No   Supplies needed? No supplies needed   Cooler needed? Yes   Do any medications need mixed or dated? No   Copay form of payment Credit card on file          Patient unsure how much atorvastatin he has remaining but denies needing any at this time. Will f/u in 2-3 weeks to check in again.      Follow-up: 2-3 weeks       Keysha Steve, PharmD, BCACP  Specialty Clinical Pharmacist  4/12/2022  10:23 EDT

## 2022-04-27 ENCOUNTER — SPECIALTY PHARMACY (OUTPATIENT)
Dept: ENDOCRINOLOGY | Facility: CLINIC | Age: 67
End: 2022-04-27

## 2022-04-27 NOTE — PROGRESS NOTES
Specialty Pharmacy Patient Management Program  Endocrinology Refill Outreach      Diogenes is a 66 y.o. male contacted today regarding refills of his specialty medication(s).    Specialty medication(s) and dose(s) confirmed: N/A  Other medications being refilled: Atorvastatin    Refill Questions    Flowsheet Row Most Recent Value   Changes to allergies? No   Changes to medications? No   New conditions since last clinic visit No   Unplanned office visit, urgent care, ED, or hospital admission in the last 4 weeks  No   How does patient/caregiver feel medication is working? Excellent   Financial problems or insurance changes  No   If yes, describe changes in insurance or financial issues. N/A   How many doses of your specialty medications were missed in the last 4 weeks? 0   Why were doses missed? N/A   Does this patient require a clinical escalation to a pharmacist? No          Delivery Questions    Flowsheet Row Most Recent Value   Delivery method FedEx   Delivery address correct? Yes   Preferred delivery time? Anytime   Number of medications in delivery 1   Medication being filled and delivered Atorvastatin   Doses left of specialty medications N/A - Not filling specialty med today, Next fill due around 6/27 or 6/28 on Farxiga and Ozempic.   Is there any medication that is due not being filled? No   Supplies needed? No supplies needed   Cooler needed? No   Do any medications need mixed or dated? No   Copay form of payment Credit card on file   Questions or concerns for the pharmacist? No   Are any medications first time fills? No          Follow-up: 6/22 for Refill Outreach on Specialty Meds: Ozempic, Farxiga     Zuleyma Krissy, Ryan  4/27/2022  10:23 EDT

## 2022-06-21 ENCOUNTER — SPECIALTY PHARMACY (OUTPATIENT)
Dept: ENDOCRINOLOGY | Facility: CLINIC | Age: 67
End: 2022-06-21

## 2022-06-21 NOTE — PROGRESS NOTES
Specialty Pharmacy Patient Management Program  Endocrinology Refill Outreach      Diogenes is a 67 y.o. male contacted today regarding refills of his medication(s).    Specialty medication(s) and dose(s) confirmed:Farxiga, Ozempic, and Toujeo   Other medications being refilled: Metformin     Refill Questions    Flowsheet Row Most Recent Value   Changes to allergies? No   Changes to medications? No   New conditions since last clinic visit No   Unplanned office visit, urgent care, ED, or hospital admission in the last 4 weeks  No   How does patient/caregiver feel medication is working? Very good   Financial problems or insurance changes  No   If yes, describe changes in insurance or financial issues. na   Since the previous refill, were any specialty medication doses or scheduled injections missed or delayed?  No   If yes, please provide the amount na   Why were doses missed? na   Does this patient require a clinical escalation to a pharmacist? No          Delivery Questions    Flowsheet Row Most Recent Value   Delivery method FedEx   Delivery address correct? Yes   Delivery phone number [phone]   Preferred delivery time? Anytime   Number of medications in delivery 4   Medication being filled and delivered Farxiga, Ozempic, Toujeo, and Metformin   Doses left of specialty medications na   Supplies needed? No supplies needed   Cooler needed? Yes   Do any medications need mixed or dated? No   Copay form of payment Credit card on file   Additional comments Copay $236.09   Questions or concerns for the pharmacist? No   Explain any questions or concerns for the pharmacist na   Are any medications first time fills? No   Shipment status Cooler packed                 Follow-up: 84 D     Jess Lancaster, Care Coordinator   Endocrinology  6/21/2022  13:11 EDT

## 2022-07-12 ENCOUNTER — SPECIALTY PHARMACY (OUTPATIENT)
Dept: ENDOCRINOLOGY | Facility: CLINIC | Age: 67
End: 2022-07-12

## 2022-07-12 ENCOUNTER — OFFICE VISIT (OUTPATIENT)
Dept: ENDOCRINOLOGY | Facility: CLINIC | Age: 67
End: 2022-07-12

## 2022-07-12 VITALS
OXYGEN SATURATION: 98 % | BODY MASS INDEX: 23.57 KG/M2 | HEIGHT: 71 IN | HEART RATE: 69 BPM | WEIGHT: 168.4 LBS | SYSTOLIC BLOOD PRESSURE: 110 MMHG | DIASTOLIC BLOOD PRESSURE: 68 MMHG

## 2022-07-12 DIAGNOSIS — E11.65 TYPE 2 DIABETES MELLITUS WITH HYPERGLYCEMIA, WITH LONG-TERM CURRENT USE OF INSULIN: Primary | Chronic | ICD-10-CM

## 2022-07-12 DIAGNOSIS — Z79.4 TYPE 2 DIABETES MELLITUS WITH HYPERGLYCEMIA, WITH LONG-TERM CURRENT USE OF INSULIN: Primary | Chronic | ICD-10-CM

## 2022-07-12 LAB
EXPIRATION DATE: NORMAL
EXPIRATION DATE: NORMAL
GLUCOSE BLDC GLUCOMTR-MCNC: 117 MG/DL (ref 70–130)
HBA1C MFR BLD: 6.8 %
Lab: NORMAL
Lab: NORMAL

## 2022-07-12 PROCEDURE — 82947 ASSAY GLUCOSE BLOOD QUANT: CPT | Performed by: PHYSICIAN ASSISTANT

## 2022-07-12 PROCEDURE — 99213 OFFICE O/P EST LOW 20 MIN: CPT | Performed by: PHYSICIAN ASSISTANT

## 2022-07-12 PROCEDURE — 83036 HEMOGLOBIN GLYCOSYLATED A1C: CPT | Performed by: PHYSICIAN ASSISTANT

## 2022-07-12 PROCEDURE — 3044F HG A1C LEVEL LT 7.0%: CPT | Performed by: PHYSICIAN ASSISTANT

## 2022-07-12 NOTE — PROGRESS NOTES
"     Office Note      Date: 2022  Patient Name: Diogenes Schneider  MRN: [id number]  : 1955    Chief Complaint   Patient presents with   • Diabetes     History of Present Illness:   Diogenes Schneider is a 67 y.o. male who presents today for follow up on type 2 diabetes.  Diabetes was diagnosed in .  Current diabetic medications: Ozempic, Farxiga, metformin, and Toujeo.  Past diabetic medications: Glipizide.  He is testing BG 2-3 times per week.  Fasting readings typically 100-120.  He denies any hypoglycemia.  Current diet: Healthy diet in general, moderating carbohydrates. Avoids sugary beverages/sweets.  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  Weight trend: Stable.  Feet: No sores or pain. Feet feel cold.  Foot exam up to date.  Eye exam current (within one year): Yes, 3/8/2022. Mild NPDR.  ACE inhibitor/ARB: No.  Statin: Yes, atorvastatin.  He has seen his PCP.  He reports being diagnosed with iron deficiency.  Started iron supplement several weeks ago.      Subjective      Review of Systems   Constitutional: Positive for malaise/fatigue.   Cardiovascular: Negative.    Endocrine: Negative.    Gastrointestinal: Negative.        Past Medical History:   Diagnosis Date   • Glaucoma    • Hypercholesterolemia    • Impotence    • Influenza vaccine needed    • Long term current use of insulin (HCC)    • Type 2 diabetes mellitus (HCC)       Past Surgical History:   Procedure Laterality Date   • COLONOSCOPY     • HERNIA REPAIR       The following portions of the patient's history were reviewed and updated as appropriate: allergies, current medications, past family history, past medical history, past social history, past surgical history and problem list.    Objective     Vitals:    22 0945   BP: 110/68   Pulse: 69   SpO2: 98%   Weight: 76.4 kg (168 lb 6.4 oz)   Height: 180.3 cm (71\")   PainSc: 0-No pain   Body mass index is 23.49 kg/m².    Physical Exam  Vitals reviewed.   Constitutional:  "      General: He is not in acute distress.  Cardiovascular:      Pulses:           Dorsalis pedis pulses are 2+ on the right side and 2+ on the left side.        Posterior tibial pulses are 2+ on the right side and 2+ on the left side.   Musculoskeletal:      Right foot: No deformity.      Left foot: No deformity.   Feet:      Right foot:      Skin integrity: Skin integrity normal.      Left foot:      Skin integrity: Skin integrity normal.   Neurological:      Mental Status: He is alert and oriented to person, place, and time.   Psychiatric:         Mood and Affect: Affect normal.       HEMOGLOBIN A1C  Lab Results   Component Value Date    HGBA1C 6.8 07/12/2022    HGBA1C 6.6 03/30/2022    HGBA1C 7.2 12/13/2021     GLUCOSE  Lab Results   Component Value Date    POCGLU 117 07/12/2022       Current Outpatient Medications   Medication Instructions   • aspirin 81 mg, Oral, Daily   • atorvastatin (LIPITOR) 20 mg, Oral, Daily   • Farxiga 10 mg, Oral, Daily   • ferrous sulfate 325 mg, Oral, Daily   • metFORMIN (GLUCOPHAGE) 1,000 mg, Oral, 2 Times Daily With Meals   • OneTouch Verio test strip Use to test blood sugar once per day; E11.9   • Ozempic (0.25 or 0.5 MG/DOSE) 0.5 mg, Subcutaneous, Weekly   • Toujeo SoloStar 42 Units, Subcutaneous, Nightly       Assessment / Plan      Assessment & Plan:  1. Type 2 diabetes mellitus with hyperglycemia, with long-term current use of insulin (HCC)  A1c increased, but remains at goal.  Continue current medications  - Ozempic, Farxiga, metformin, and Toujeo.  Encouraged nutritious dietary choices and regular exercise.  BP okay.  Labs up to date with PCP.  - POC Glucose, Blood  - POC Glycosylated Hemoglobin (Hb A1C)      Return in about 3 months (around 10/12/2022) for next scheduled follow up. He was advised to contact the office with any interval questions or concerns.    JOSÉ MIGUEL Tamez  Endocrinology  07/12/2022

## 2022-07-12 NOTE — PROGRESS NOTES
Specialty Pharmacy Patient Management Program  Endocrinology Reassessment     Diogenes Schneider is a 67 y.o. male with Type 2 Diabetes seen by an Endocrinology provider and enrolled in the Endocrinology Patient Management program offered by [facility].  A follow-up outreach was conducted, including assessment of continued therapy appropriateness, medication adherence, and side effect incidence and management for Ozempic, Farxiga and Toujeo.    Changes to Insurance Coverage or Financial Support  None     Relevant Past Medical History and Comorbidities  Relevant medical history and concomitant health conditions were discussed with the patient. The patient's chart has been reviewed for relevant past medical history and comorbid health conditions and updated as necessary.   Past Medical History:   Diagnosis Date   • Glaucoma    • Hypercholesterolemia    • Impotence    • Influenza vaccine needed    • Long term current use of insulin (HCC)    • Type 2 diabetes mellitus (HCC)      Social History     Socioeconomic History   • Marital status:    Tobacco Use   • Smoking status: Never Smoker   • Smokeless tobacco: Never Used   Vaping Use   • Vaping Use: Never used   Substance and Sexual Activity   • Alcohol use: Not Currently     Comment: One beer per month   • Drug use: Never   • Sexual activity: Yes     Partners: Female     Birth control/protection: Post-menopausal       Problem list reviewed by Zuleyma Rey RPH on 7/12/2022 at 11:27 AM    Allergies  Known allergies and reactions were discussed with the patient. The patient's chart has been reviewed for allergy information and updated as necessary.   Patient has no known allergies.    Allergies reviewed by Zuleyma Rey RPH on 7/12/2022 at 11:27 AM    Relevant Laboratory Values  A1C Last 3 Results    HGBA1C Last 3 Results 12/13/21 3/30/22 7/12/22   Hemoglobin A1C 7.2 6.6 6.8           Lab Results   Component Value Date    HGBA1C 6.8  07/12/2022     Lab Results   Component Value Date    GLUCOSE 74 02/23/2021    CALCIUM 9.8 02/23/2021     02/23/2021    K 4.2 02/23/2021    CO2 24.2 02/23/2021     02/23/2021    BUN 10 02/23/2021    CREATININE 0.84 02/23/2021    EGFRIFNONA 92 02/23/2021    BCR 11.9 02/23/2021    ANIONGAP 12.8 02/23/2021     Lab Results   Component Value Date    CHOL 96 02/23/2021    TRIG 88 02/23/2021    HDL 40 02/23/2021    LDL 39 02/23/2021     Current Medication List  This medication list has been reviewed with the patient and evaluated for any interactions or necessary modifications/recommendations, and updated to include all prescription medications, OTC medications, and supplements the patient is currently taking.  This list reflects what is contained in the patient's profile, which has also been marked as reviewed to communicate to other providers it is the most up to date version of the patient's current medication therapy.     Current Outpatient Medications:   •  aspirin 81 MG EC tablet, Take 81 mg by mouth Daily., Disp: , Rfl:   •  atorvastatin (LIPITOR) 20 MG tablet, Take 1 tablet by mouth Daily., Disp: 90 tablet, Rfl: 3  •  Farxiga 10 MG tablet, Take 10 mg by mouth Daily., Disp: 90 tablet, Rfl: 3  •  ferrous sulfate 325 (65 FE) MG tablet, Take 1 tablet by mouth Daily., Disp: 90 tablet, Rfl: 0  •  metFORMIN (GLUCOPHAGE) 500 MG tablet, Take 2 tablets by mouth 2 (Two) Times a Day With Meals., Disp: 360 tablet, Rfl: 3  •  OneTouch Verio test strip, Use to test blood sugar once per day; E11.9, Disp: 100 each, Rfl: 3  •  Ozempic, 0.25 or 0.5 MG/DOSE, 2 MG/1.5ML solution pen-injector, Inject 0.5 mg under the skin into the appropriate area as directed 1 (One) Time Per Week., Disp: 4.5 mL, Rfl: 3  •  Toujeo SoloStar 300 UNIT/ML solution pen-injector injection, Inject 42 Units under the skin into the appropriate area as directed Every Night., Disp: 13.5 mL, Rfl: 3    Medicines reviewed by Zuleyma Rey, WISAM on  7/12/2022 at 11:27 AM    Drug Interactions  No Clinically Significant DDIs Noted at Present Time on Medication Review    Recommended Medications Assessment  • Aspirin - Currently Taking   • Statin - Currently Taking   • ACEi/ARB - Not Taking Currently    Adverse Drug Reactions  • Adverse Reactions Experienced: None   • Plan for ADR Management: None Required     Hospitalizations and Urgent Care Since Last Assessment  • Hospitalizations or Admissions: None   • ED Visits: None  • Urgent Office Visits: None    Adherence and Self-Administration  Adherence related patient's specialty therapy was discussed with the patient. The Adherence segment of this outreach has been reviewed and updated.     Medication Adherence    Patient reported X missed doses in the last 7 days: 0  Any gaps in refill history greater than 2 weeks in the last 3 months: no  Demonstrates understanding of importance of adherence: yes  Informant: patient  Reliability of informant: reliable  Estimated medication adherence level: %  Adherence estimation source: claims history  Reasons for non-adherence: no problems identified   Other support networks:          • Ongoing or New Barriers to Patient Self-Administration: None  • Methods for Supporting Patient Self-Administration:     Goals of Therapy  Goals related to the patient's specialty therapy was discussed with the patient. The Patient Goals segment of this outreach has been reviewed and updated.    Goals     • Therapeutic/Clinical Goal      Maintain A1C Below 7%    3/30/22:  A1c in office today was 6.6% - patient adherent to medication therapies and understands importance of adherence. On track.      7/12/22: A1c in office today was 6.8% - patient adherent to medication therapies (%, no reported missed doses) and understands importance of adherence. On track, no current barriers to adherence or intervention required.                Quality of Life Assessment   Quality of Life  related to the patient's specialty therapy was discussed with the patient. The QOL segment of this outreach has been reviewed and updated.     Quality of Life Assessment  Quality of Life Improvement Scale: Significantly better    Reassessment Plan & Follow-Up  1. Medication Therapy Changes: No Changes   2. Additional Plans, Therapy Recommendations, or Therapy Problems to Be Addressed: None  3. Pharmacist to perform regular reassessments no more than (6) months from the previous assessment.  4. Care Coordinator to set up future refill outreaches, coordinate prescription delivery, and escalate clinical questions to pharmacist.     Attestation  I attest that the specialty medication(s) addressed above are appropriate for the patient based on my reassessment.  If the prescribed therapy is at any point deemed not appropriate based on the current or future assessments, a consultation will be initiated with the patient's specialty care provider to determine the best course of action. The revised plan of therapy will be documented along with any additional patient education provided.     Zuleyma Rey, Ryan   7/12/2022  13:08 EDT

## 2022-07-22 ENCOUNTER — SPECIALTY PHARMACY (OUTPATIENT)
Dept: ENDOCRINOLOGY | Facility: CLINIC | Age: 67
End: 2022-07-22

## 2022-07-22 NOTE — PROGRESS NOTES
Specialty Pharmacy Patient Management Program  Endocrinology Refill Outreach      Diogenes is a 67 y.o. male contacted today regarding refills of his medication(s).    Specialty medication(s) and dose(s) confirmed: na  Other medications being refilled: Lipitor    Refill Questions    Flowsheet Row Most Recent Value   Changes to allergies? No   Changes to medications? No   New conditions since last clinic visit No   Unplanned office visit, urgent care, ED, or hospital admission in the last 4 weeks  No   How does patient/caregiver feel medication is working? Very good   Financial problems or insurance changes  No   Since the previous refill, were any specialty medication doses or scheduled injections missed or delayed?  No   Does this patient require a clinical escalation to a pharmacist? No          Delivery Questions    Flowsheet Row Most Recent Value   Delivery method FedEx   Delivery address correct? Yes   Delivery phone number [phone]   Preferred delivery time? Anytime   Number of medications in delivery 1   Medication being filled and delivered Lipitor   Doses left of specialty medications na   Is there any medication that is due not being filled? No   Supplies needed? No supplies needed   Cooler needed? No   Do any medications need mixed or dated? No   Copay form of payment Credit card on file   Additional comments Copay $13.22   Questions or concerns for the pharmacist? No   Explain any questions or concerns for the pharmacist na   Are any medications first time fills? No   Shipment status Delivery complete          Medication Adherence     Other support network:              Follow-up: 90D     Jess Lancaster, Care Coordinator   Endocrinology  7/22/2022  09:55 EDT

## 2022-08-25 ENCOUNTER — SPECIALTY PHARMACY (OUTPATIENT)
Dept: ENDOCRINOLOGY | Facility: CLINIC | Age: 67
End: 2022-08-25

## 2022-08-25 NOTE — PROGRESS NOTES
Specialty Pharmacy Patient Management Program  Endocrinology Refill Outreach      Diogenes is a 67 y.o. male contacted today regarding refills of his medication(s).    Specialty medication(s) and dose(s) confirmed: None  Other medications being refilled: Ferrous Sulfate    Refill Questions    Flowsheet Row Most Recent Value   Changes to allergies? No   Changes to medications? No   New conditions since last clinic visit No   Unplanned office visit, urgent care, ED, or hospital admission in the last 4 weeks  No   How does patient/caregiver feel medication is working? Very good   Financial problems or insurance changes  No   If yes, describe changes in insurance or financial issues. na   If yes, please provide the amount na   Why were doses missed? na   Does this patient require a clinical escalation to a pharmacist? No          Delivery Questions    Flowsheet Row Most Recent Value   Delivery method FedEx   Delivery address correct? Yes   Delivery phone number [phone]   Preferred delivery time? Anytime   Number of medications in delivery 1   Medication being filled and delivered Ferrous Sulfate   Doses left of specialty medications na   Is there any medication that is due not being filled? No   Supplies needed? No supplies needed   Cooler needed? No   Do any medications need mixed or dated? No   Copay form of payment Credit card on file   Additional comments Copay $6.17   Questions or concerns for the pharmacist? No   Are any medications first time fills? No          Medication Adherence     Other support network:            Follow-up: 90d    Zuleyma Rey PharmD  Clinical Specialty Pharmacist, Endocrinology  8/25/2022  08:20 EDT

## 2022-08-29 ENCOUNTER — TELEPHONE (OUTPATIENT)
Dept: ENDOCRINOLOGY | Facility: CLINIC | Age: 67
End: 2022-08-29

## 2022-08-29 NOTE — TELEPHONE ENCOUNTER
PT WOULD LIKE TO TALK TO KARINA MONCADA  HE SAW HIS PCP  [facility]    [facility] NUM[facility]  [phone]

## 2022-08-29 NOTE — TELEPHONE ENCOUNTER
He can stop the Ozempic to see if diarrhea and appetite improves.  Since it is a long-acting medication, will likely take a few weeks to see improvement.  Please ask that he call back to let me know how he does off the medication.  Thank you.

## 2022-08-29 NOTE — TELEPHONE ENCOUNTER
Pt. Reports that for about a month he has developed diarrhea that occurs on Mondays and Tuesdays and he has related it to his Ozempic he takes on Sunday mornings. He has been on the 0.5mg for awhile now. He doesn't;t know if he should cut the dose back or stop the Ozempic. He has no appetite and has lost 5lbs. In the past month. Please advise. LAI

## 2022-08-30 NOTE — TELEPHONE ENCOUNTER
Patient returned call, read him what Whitley Moser had stated in the Chart about disc Ozempic over the next few weeks and seeing if the diarhea disc and appetite returns.  Patient stated he understood and would report back in few weeks.

## 2022-09-20 ENCOUNTER — SPECIALTY PHARMACY (OUTPATIENT)
Dept: ENDOCRINOLOGY | Facility: CLINIC | Age: 67
End: 2022-09-20

## 2022-09-20 NOTE — PROGRESS NOTES
Specialty Pharmacy Patient Management Program  Endocrinology Refill Outreach      Diogenes is a 67 y.o. male contacted today regarding refills of his medication(s).    Specialty medication(s) and dose(s) confirmed: Farxiga  Other medications being refilled: na    Patient has stopped taking Ozempic due to stomach issues, and he has notified Dr. Moser.     Refill Questions    Flowsheet Row Most Recent Value   Changes to allergies? No   Changes to medications? Yes   [Patient has stopped Ozempic due to stomach issues. He let Dr. Moser know.]   New conditions since last clinic visit No   Unplanned office visit, urgent care, ED, or hospital admission in the last 4 weeks  No   How does patient/caregiver feel medication is working? Very good   Financial problems or insurance changes  No   If yes, describe changes in insurance or financial issues. na   Since the previous refill, were any specialty medication doses or scheduled injections missed or delayed?  No   If yes, please provide the amount na   Why were doses missed? na   Does this patient require a clinical escalation to a pharmacist? Yes   [Patient has stopped Ozempic due to stomach issues. He let Dr. Moser know.]          Delivery Questions    Flowsheet Row Most Recent Value   Delivery method FedEx   Delivery address correct? Yes   Delivery phone number [phone]   Preferred delivery time? Anytime   Number of medications in delivery 1   Medication being filled and delivered Farxiga   Doses left of specialty medications na   Is there any medication that is due not being filled? No   Supplies needed? No supplies needed   Cooler needed? No   Do any medications need mixed or dated? No   Copay form of payment Credit card on file   Additional comments Copay $1.60   Questions or concerns for the pharmacist? Yes   [Patient has stopped Ozempic due to stomach issues. He let Dr. Moser know.]   Explain any questions or concerns for the pharmacist na   Are any medications  first time fills? No   Tracking number for delivery na   Shipment status Delivery complete          Medication Adherence     Other support network:              Follow-up: 90 Days      Jess Lancaster, Care Coordinator   Endocrinology  9/20/2022  11:44 EDT

## 2022-09-21 ENCOUNTER — TELEPHONE (OUTPATIENT)
Dept: ENDOCRINOLOGY | Facility: CLINIC | Age: 67
End: 2022-09-21

## 2022-09-21 NOTE — TELEPHONE ENCOUNTER
Specialty Pharmacy Patient Management Program  One-Time Clinical Outreach - MTP     Diogenes Schneider is a 67 y.o. male with Type 2 Diabetes seen by an Endocrinology provider and enrolled in the Endocrinology Patient Management program offered by [facility].  A one-time clinical outreach was conducted on 9/20/2022 over telephone.  Created MTP for DANNY and for adherence.     Patient stated he was having severe stomach issues including nausea and dyspepsia while taking Ozempic, and therefore had stopped taking it altogether.    Asked if he would be interested in more information about potential solutions including supportive care measures (PPI or antiemetic).  Patient was not interested in supportive care or restarting therapy as he claims his BG readings have been WNL and his A1C is <7%.  Would prefer to stay off of Ozempic permenently and plans to discuss at his next appt.        Nazario Galdamez, PharmD   9/20/2022  11:37 EDT

## 2022-09-21 NOTE — PROGRESS NOTES
Specialty Pharmacy Patient Management Program  One-Time Clinical Outreach     Diogenes Schneider is a 67 y.o. male with Type 2 Diabetes seen by an Endocrinology provider and enrolled in the Endocrinology Patient Management program offered by [facility].  A one-time clinical outreach was conducted on 9/20/2022 over telephone.  Created MTP for DANNY and for adherence.    Patient stated he was having severe stomach issues including nausea and dyspepsia while taking Ozempic, and therefore had stopped taking it altogether.  Asked if he would be interested in more information about potential solutions including supportive care measures (PPI or antiemetic).  Patient was not interested in supportive care or restarting therapy as he claims his BG readings have been WNL and his A1C is <7%.  Would prefer to stay off of Ozempic permenently and plans to discuss this with Whitley Moser at his next appt.       Nazario Galdamez, PharmD   9/20/2022  11:37 EDT

## 2022-10-10 ENCOUNTER — SPECIALTY PHARMACY (OUTPATIENT)
Dept: ENDOCRINOLOGY | Facility: CLINIC | Age: 67
End: 2022-10-10

## 2022-10-10 NOTE — PROGRESS NOTES
Specialty Pharmacy Patient Management Program  One-Time Clinical Outreach     Diogenes Schneider is a 67 y.o. male with Type 2 Diabetes seen by an Endocrinology provider and enrolled in the Endocrinology Patient Management program offered by [facility].      Patient reporting mild diarrhea around time of Ozempic injection each week. Currently benefit of Ozempic outweighing side effects, but patient to call pharmacist or provider office if issue becomes persistent or severe.     Zuleyma Rey PharmD   10/10/2022  13:23 EDT

## 2022-10-10 NOTE — PROGRESS NOTES
Specialty Pharmacy Patient Management Program  Endocrinology Refill Outreach      Diogenes is a 67 y.o. male contacted today regarding refills of his medication(s).    Specialty medication(s) and dose(s) confirmed: Toujeo, Ozempic   Other medications being refilled: Metformin, Atorvastatin    Refill Questions    Flowsheet Row Most Recent Value   Changes to allergies? No   Changes to medications? No   New conditions since last clinic visit No  [Patient reports diarrhea on Sunday and Monday after taking his injection.]   Unplanned office visit, urgent care, ED, or hospital admission in the last 4 weeks  No   How does patient/caregiver feel medication is working? Good   Financial problems or insurance changes  No   If yes, describe changes in insurance or financial issues. NA   Since the previous refill, were any specialty medication doses or scheduled injections missed or delayed?  No   If yes, please provide the amount NA   Why were doses missed? NA   Does this patient require a clinical escalation to a pharmacist? No              Medication Adherence     Other support network:              Follow-up: 84 Days      Jess Lancaster, Care Coordinator   Endocrinology  10/10/2022  10:41 EDT

## 2022-10-25 ENCOUNTER — OFFICE VISIT (OUTPATIENT)
Dept: ENDOCRINOLOGY | Facility: CLINIC | Age: 67
End: 2022-10-25
Payer: MEDICARE

## 2022-10-25 VITALS
WEIGHT: 173 LBS | DIASTOLIC BLOOD PRESSURE: 68 MMHG | SYSTOLIC BLOOD PRESSURE: 122 MMHG | HEIGHT: 71 IN | BODY MASS INDEX: 24.22 KG/M2 | HEART RATE: 64 BPM | OXYGEN SATURATION: 98 %

## 2022-10-25 DIAGNOSIS — E11.65 TYPE 2 DIABETES MELLITUS WITH HYPERGLYCEMIA, WITH LONG-TERM CURRENT USE OF INSULIN: Primary | Chronic | ICD-10-CM

## 2022-10-25 DIAGNOSIS — Z79.4 TYPE 2 DIABETES MELLITUS WITH HYPERGLYCEMIA, WITH LONG-TERM CURRENT USE OF INSULIN: Primary | Chronic | ICD-10-CM

## 2022-10-25 LAB
EXPIRATION DATE: ABNORMAL
EXPIRATION DATE: NORMAL
GLUCOSE BLDC GLUCOMTR-MCNC: 143 MG/DL (ref 70–130)
HBA1C MFR BLD: 7.8 %
Lab: ABNORMAL
Lab: NORMAL

## 2022-10-25 PROCEDURE — 82947 ASSAY GLUCOSE BLOOD QUANT: CPT | Performed by: PHYSICIAN ASSISTANT

## 2022-10-25 PROCEDURE — 3051F HG A1C>EQUAL 7.0%<8.0%: CPT | Performed by: PHYSICIAN ASSISTANT

## 2022-10-25 PROCEDURE — 83036 HEMOGLOBIN GLYCOSYLATED A1C: CPT | Performed by: PHYSICIAN ASSISTANT

## 2022-10-25 PROCEDURE — 99213 OFFICE O/P EST LOW 20 MIN: CPT | Performed by: PHYSICIAN ASSISTANT

## 2022-10-25 NOTE — PROGRESS NOTES
"     Office Note      Date: 10/25/2022  Patient Name: Diogenes Schneider  MRN: [id number]  : 1955    Chief Complaint   Patient presents with   • Diabetes       History of Present Illness  Diogenes Schneider is a 67 y.o. male who presents today for follow up on type 2 diabetes.   Diabetes was diagnosed in .  Current diabetic medications: Toujeo, Ozempic, Farxiga, and metformin.  Past diabetic medications: Glipizide.  Taking Ozempic on  mornings.  He reports diarrhea for 2-3 days following injections.  He called the office 2 months ago concerning poor appetite and diarrhea.  I recommended to stop the Ozempic.  He reports that he did stop the Ozempic for about a month.  He restarted this several weeks ago.  He took the 0.25 mg dose for 2 weeks, then increased to the 0.5 mg dose this week.  He is testing fasting FSBG daily.  Readings are typically in 130s.  Feet: No issues.  Foot exam up to date.  Last eye exam: 3/8/2022. Mild NPDR.  ACE inhibitor/ARB: Not indicated.  Statin: Atorvastatin.    Review of systems  As noted in HPI.    Past Medical History:   Diagnosis Date   • Glaucoma    • Hypercholesterolemia    • Impotence    • Influenza vaccine needed    • Iron deficiency    • Long term current use of insulin (HCC)    • Type 2 diabetes mellitus (HCC)       Past Surgical History:   Procedure Laterality Date   • COLONOSCOPY     • HERNIA REPAIR       The following portions of the patient's history were reviewed and updated as appropriate: allergies, current medications, past family history, past medical history, past social history, past surgical history and problem list.    Vitals:  /68   Pulse 64   Ht 180.3 cm (71\")   Wt 78.5 kg (173 lb)   SpO2 98%   BMI 24.13 kg/m²     Physical Exam  Vitals reviewed.   Constitutional:       General: He is not in acute distress.  Neurological:      Mental Status: He is alert and oriented to person, place, and time.   Psychiatric:         Mood and Affect: Affect " normal.       Labs/Imaging    Hemoglobin A1c  Lab Results   Component Value Date    HGBA1C 7.8 10/25/2022    HGBA1C 6.8 07/12/2022    HGBA1C 6.6 03/30/2022     Glucose  Lab Results   Component Value Date    POCGLU 143 (A) 10/25/2022       Current Outpatient Medications   Medication Instructions   • aspirin 81 mg, Oral, Daily   • atorvastatin (LIPITOR) 20 mg, Oral, Daily   • Farxiga 10 mg, Oral, Daily   • ferrous sulfate 325 mg, Oral, Daily   • metFORMIN (GLUCOPHAGE) 1,000 mg, Oral, 2 Times Daily With Meals   • OneTouch Verio test strip Use to test blood sugar once per day; E11.9   • Ozempic (0.25 or 0.5 MG/DOSE) 0.5 mg, Subcutaneous, Weekly   • Toujeo SoloStar 42 Units, Subcutaneous, Nightly       Assessment & Plan  1. Type 2 diabetes mellitus with hyperglycemia, with long-term current use of insulin (HCC)  A1c increased, above goal.  He feels that he is tolerating the Ozempic okay.  He would like to continue for now and reassess at appointment in a month.  Continue Toujeo, Farxiga, and metformin along with the Ozempic.  Encouraged nutritious dietary choices, moderation of carbohydrates, and regular exercise.  - POC Glucose, Blood  - POC Glycosylated Hemoglobin (Hb A1C)      Return for follow up in 1 month and 3 months. He was advised to contact the office with any interval questions or concerns.    JOSÉ MIGUEL Tamez  Endocrinology  10/25/2022

## 2022-10-26 ENCOUNTER — SPECIALTY PHARMACY (OUTPATIENT)
Dept: ENDOCRINOLOGY | Facility: CLINIC | Age: 67
End: 2022-10-26

## 2022-10-26 NOTE — PROGRESS NOTES
Specialty Pharmacy Patient Management Program  One-Time Clinical Outreach - MTP F/U     Diogenes Schneider is a 67 y.o. male with Type 2 Diabetes seen by an Endocrinology provider and enrolled in the Endocrinology Patient Management program offered by [facility].      A one-time clinical outreach was conducted on 10/25 during appt to f/u on previously opened MTP regarding Ozempic adherence d/t DANNY's.    9/20/2022 @1130 - Patient stated he was having severe stomach issues including nausea and dyspepsia while taking Ozempic, and therefore had stopped taking it altogether.  Asked if he would be interested in more information about potential solutions including supportive care measures (PPI or antiemetic).  Patient was not interested in supportive care or restarting therapy as he claims his BG readings have been WNL and his A1C is <7%.  Would prefer to stay off of Ozempic permenently and plans to discuss this with Whitley Moser at his next appt.    10/25 @1200 - Patient stated he has since started back on Ozempic and plans to keep taking and tolerating at least until next f/u on 12/9 @1400.  Will reassess at that time.    Nazario Galdamez, PharmD  Clinical Specialty Pharmacist, Endocrinology  10/26/2022  09:35 EDT

## 2022-11-21 ENCOUNTER — SPECIALTY PHARMACY (OUTPATIENT)
Dept: ENDOCRINOLOGY | Facility: CLINIC | Age: 67
End: 2022-11-21

## 2022-11-21 NOTE — PROGRESS NOTES
Specialty Pharmacy Patient Management Program  Endocrinology Refill Outreach      Diogenes is a 67 y.o. male contacted today regarding refills of his medication(s).    Specialty medication(s) and dose(s) confirmed: na  Other medications being refilled:Ferrous Sulfate     Refill Questions    Flowsheet Row Most Recent Value   Changes to allergies? No   Changes to medications? No   New conditions since last clinic visit No   Unplanned office visit, urgent care, ED, or hospital admission in the last 4 weeks  No   How does patient/caregiver feel medication is working? Very good   Financial problems or insurance changes  No   Since the previous refill, were any specialty medication doses or scheduled injections missed or delayed?  No   Does this patient require a clinical escalation to a pharmacist? No          Delivery Questions    Flowsheet Row Most Recent Value   Delivery method FedEx   Delivery address correct? Yes   Delivery phone number    Preferred delivery time? Anytime   Number of medications in delivery 1   Medication being filled and delivered Iron   Is there any medication that is due not being filled? No   Supplies needed? No supplies needed   Cooler needed? No   Do any medications need mixed or dated? No   Copay form of payment Credit card on file   Additional comments Copay $8.22   Questions or concerns for the pharmacist? No   Are any medications first time fills? No   Shipment status Delivery complete          Medication Adherence     Other support network:              Follow-up: 90 Days      Jess Lancaster, Care Coordinator   Endocrinology  11/21/2022  12:40 EST

## 2023-01-03 ENCOUNTER — SPECIALTY PHARMACY (OUTPATIENT)
Dept: ENDOCRINOLOGY | Facility: CLINIC | Age: 68
End: 2023-01-03
Payer: MEDICARE

## 2023-01-03 RX ORDER — LANCETS 33 GAUGE
EACH MISCELLANEOUS
Qty: 100 EACH | Refills: 5 | Status: SHIPPED | OUTPATIENT
Start: 2023-01-03

## 2023-01-03 NOTE — TELEPHONE ENCOUNTER
Specialty Pharmacy Patient Management Program  Prescription Refill Request     Patient currently fills medications at  Pharmacy. Needing refill(s) on the following:      Requested Prescriptions     Pending Prescriptions Disp Refills   • OneTouch Delica Lancets 33G misc 100 each 5     Sig: Use to test blood sugar once per day; E11.9     Pended for Dr. Hunt to review, and approve if appropriate as Whitley Moser is out of office.     Zuleyma Rey PharmD, BCACP  Clinical Specialty Pharmacist, Endocrinology  1/3/2023  12:52 EST

## 2023-01-03 NOTE — PROGRESS NOTES
Specialty Pharmacy Patient Management Program  Endocrinology Reassessment     Diogenes Schneider is a 67 y.o. male seen by an Endocrinology provider for Type 2 Diabetes and enrolled in the Endocrinology Patient Management program offered by [facility].  A follow-up outreach was conducted, including assessment of continued therapy appropriateness, medication adherence, and side effect incidence and management for Ozempic, Farxiga and Toujeo.    Changes to Insurance Coverage or Financial Support  None    Relevant Past Medical History and Comorbidities  Relevant medical history and concomitant health conditions were discussed with the patient. The patient's chart has been reviewed for relevant past medical history and comorbid health conditions and updated as necessary.   Past Medical History:   Diagnosis Date   • Glaucoma    • Hypercholesterolemia    • Impotence    • Influenza vaccine needed    • Long term current use of insulin (HCC)    • Type 2 diabetes mellitus (HCC)      Social History     Socioeconomic History   • Marital status:    Tobacco Use   • Smoking status: Never   • Smokeless tobacco: Never   Vaping Use   • Vaping Use: Never used   Substance and Sexual Activity   • Alcohol use: Not Currently     Comment: One beer per month   • Drug use: Never   • Sexual activity: Yes     Partners: Female     Birth control/protection: Post-menopausal       Problem list reviewed by Zuleyma Rey PharmD on 1/3/2023 at  1:24 PM    Allergies  Known allergies and reactions were discussed with the patient. The patient's chart has been reviewed for allergy information and updated as necessary.   Patient has no known allergies.    Allergies reviewed by Zuleyma Rey PharmD on 1/3/2023 at  1:24 PM    Relevant Laboratory Values  A1C Last 3 Results    HGBA1C Last 3 Results 3/30/22 7/12/22 10/25/22   Hemoglobin A1C 6.6 6.8 7.8           Lab Results   Component Value Date    HGBA1C 7.8 10/25/2022      Lab Results   Component Value Date    GLUCOSE 74 02/23/2021    CALCIUM 9.8 02/23/2021     02/23/2021    K 4.2 02/23/2021    CO2 24.2 02/23/2021     02/23/2021    BUN 10 02/23/2021    CREATININE 0.84 02/23/2021    EGFRIFNONA 92 02/23/2021    BCR 11.9 02/23/2021    ANIONGAP 12.8 02/23/2021     Lab Results   Component Value Date    CHOL 96 02/23/2021    TRIG 88 02/23/2021    HDL 40 02/23/2021    LDL 39 02/23/2021       Current Medication List  This medication list has been reviewed with the patient and evaluated for any interactions or necessary modifications/recommendations, and updated to include all prescription medications, OTC medications, and supplements the patient is currently taking.  This list reflects what is contained in the patient's profile, which has also been marked as reviewed to communicate to other providers it is the most up to date version of the patient's current medication therapy.     Current Outpatient Medications:   •  aspirin 81 MG EC tablet, Take 81 mg by mouth Daily., Disp: , Rfl:   •  atorvastatin (LIPITOR) 20 MG tablet, Take 1 tablet by mouth Daily., Disp: 90 tablet, Rfl: 3  •  Farxiga 10 MG tablet, Take 10 mg by mouth Daily., Disp: 90 tablet, Rfl: 3  •  ferrous sulfate 325 (65 FE) MG tablet, Take 1 tablet by mouth Daily., Disp: 90 tablet, Rfl: 0  •  metFORMIN (GLUCOPHAGE) 500 MG tablet, Take 2 tablets by mouth 2 (Two) Times a Day With Meals., Disp: 360 tablet, Rfl: 3  •  OneTouch Delica Lancets 33G misc, Use to test blood sugar once per day; E11.9, Disp: 100 each, Rfl: 5  •  OneTouch Verio test strip, Use to test blood sugar once per day; E11.9, Disp: 100 each, Rfl: 3  •  Ozempic, 0.25 or 0.5 MG/DOSE, 2 MG/1.5ML solution pen-injector, Inject 0.5 mg under the skin into the appropriate area as directed 1 (One) Time Per Week., Disp: 4.5 mL, Rfl: 3  •  Toujeo SoloStar 300 UNIT/ML solution pen-injector injection, Inject 42 Units under the skin into the appropriate area as  directed Every Night., Disp: 13.5 mL, Rfl: 3    Medicines reviewed by Zuleyma Rey, PharmD on 1/3/2023 at  1:24 PM    Drug Interactions  No Clinically Significant DDIs Were Identified at Present Time Upon Marking Medications Reviewed    Recommended Medications Assessment  • Aspirin: Currently Taking   • Statin: Currently Taking   • ACEi/ARB: Not Taking Currently    Adverse Drug Reactions  • Adverse Reactions Experienced: None Additional - Patient previously reported nausea with Ozempic, See MTP Section for Additional Details  • Plan for ADR Management:  See MTP Section for Additional Details    Hospitalizations and Urgent Care Since Last Assessment  • ED Visits, Admissions, or Hospitalizations: None  • Urgent Office Visits: None     Adherence, Self-Administration, and Current Therapy Problems  Adherence related to the patient's specialty therapy was discussed with the patient. The Adherence segment of this outreach has been reviewed and updated.     Adherence Questions  Medication(s) assessed: Toujeo, Farxiga, Ozempic  On average, how many doses/injections does the patient miss per month?: 0  What are the identified reasons for non-adherence or missed doses? : no problems identfied  What is the estimated medication adherence level?: %  Based on the patient/caregiver response and refill history, does this patient require an MTP to track adherence improvements?: no    • Ongoing or New Barriers to Patient Self-Administration: None Identified or Reported at This Time  • Methods for Supporting Patient Self-Administration: N/A     Medication Therapy Recommendations  Type 2 diabetes mellitus without complication, with long-term current use of insulin (HCC)    Current Medication: Ozempic, 0.25 or 0.5 MG/DOSE, 2 MG/1.5ML solution pen-injector   Rationale: Undesirable effect - Adverse medication event - Safety   Recommendation: Provide Education   Note: 9/20/2022 @1130 - Patient stated he was having severe stomach  issues including nausea and dyspepsia while taking Ozempic, and therefore had stopped taking it altogether.  Asked if he would be interested in more information about potential solutions including supportive care measures (PPI or antiemetic).  Patient was not interested in supportive care or restarting therapy as he claims his BG readings have been WNL and his A1C is <7%.  Would prefer to stay off of Ozempic permenently and plans to discuss this with Whitley Moser at his next appt.  10/25/2022 @1200 - Patient stated he has since started back on Ozempic and plans to keep taking and tolerating at least until next f/u on 12/9 @1400.  Will reassess at that time.  1/3/2022: On reassessment, patient still tolerating Ozempic okay for now, feels manageable until next provider visit where risk/benefit and need for continuation can be discussed with provider. Will reschedule MTP follow-up for next appt and reassess at that time. KL          Current Medication: Ozempic, 0.25 or 0.5 MG/DOSE, 2 MG/1.5ML solution pen-injector   Rationale: Patient prefers not to take - Adherence - Adherence   Recommendation: Provide Education   Note: 9/20/2022 @1130 - Patient stated he was having severe stomach issues including nausea and dyspepsia while taking Ozempic, and therefore had stopped taking it altogether.  Asked if he would be interested in more information about potential solutions including supportive care measures (PPI or antiemetic).  Patient was not interested in supportive care or restarting therapy as he claims his BG readings have been WNL and his A1C is <7%.  Would prefer to stay off of Ozempic permenently and plans to discuss this with Whitley Moser at his next appt.  10/25 @1200 - Patient stated he has since started back on Ozempic and plans to keep taking and tolerating at least until next f/u on 12/9 @1400.  Will reassess at that time.           Goals of Therapy  Goals related to the patient's specialty therapy were  discussed with the patient. The Patient Goals segment of this outreach has been reviewed and updated.   Goals     • Therapeutic/Clinical Goal      Maintain A1C Below 7%    3/30/22:  A1c in office today was 6.6% - patient adherent to medication therapies and understands importance of adherence. On track. KL     7/12/22: A1c in office today was 6.8% - patient adherent to medication therapies (%, no reported missed doses) and understands importance of adherence. On track, no current barriers to adherence or intervention required.  KL     1/3/22: No new values since last assessment, patient appt canceled d/t provider being out of office.  Next appt is 3/3/23. Lab value will be obtained at that time and this goal will be reassessed. Patient endorses adherence to therapies, will manjinder as on track for today.            Quality of Life Assessment   Quality of Life related to the patient's enrollment in the patient management program and services provided was discussed with the patient. The QOL segment of this outreach has been reviewed and updated.    Quality of Life Improvement Scale: Significantly better    Reassessment Plan & Follow-Up  1. Medication Therapy Changes: None Today  2. Additional Plans, Therapy Recommendations, or Therapy Problems to Be Addressed: Active MTP for Ozempic, As Outlined Above. Follow up scheduled for next appt on 3/3/23, unless status changes prior.   3. Pharmacist to perform regular assessments no more than (6) months from the previous assessment.  4. Care Coordinator to set up future refill outreaches, coordinate prescription delivery, and escalate clinical questions to pharmacist.    Attestation  I attest that the specialty medications addressed above are appropriate for the patient based on my reassessment. If the prescribed therapy is at any point deemed not appropriate based on the current or future assessments, a consultation will be initiated with the patient's specialty care  provider to determine the best course of action. The revised plan of therapy will be documented along with any required assessments and/or additional patient education provided.     Zuleyma Rey, PharmD, BCACP  Clinical Specialty Pharmacist, Endocrinology  1/3/2023  13:38 EST           Specialty Pharmacy Patient Management Program  Endocrinology Refill Outreach      Diogenes is a 67 y.o. male contacted today regarding refills of his medication(s).    Specialty medication(s) and dose(s) confirmed: Farxiga, Toujeo, Ozempic  Other medications being refilled: Metformin, Lancets    Refill Questions    Flowsheet Row Most Recent Value   Changes to allergies? No   Changes to medications? No   New conditions since last clinic visit No   Unplanned office visit, urgent care, ED, or hospital admission in the last 4 weeks  No   How does patient/caregiver feel medication is working? Very good   Financial problems or insurance changes  No   If yes, describe changes in insurance or financial issues. NA   Since the previous refill, were any specialty medication doses or scheduled injections missed or delayed?  No   If yes, please provide the amount NA   Why were doses missed? NA   Does this patient require a clinical escalation to a pharmacist? No          Delivery Questions    Flowsheet Row Most Recent Value   Delivery method [employer]Ex   Delivery address correct? Yes   Delivery phone number    Preferred delivery time? Anytime   Number of medications in delivery 5   Medication being filled and delivered Toujeo, Farxiga, Ozempic, Metformin, Lancets   Is there any medication that is due not being filled? No   Supplies needed? No supplies needed   Do any medications need mixed or dated? No   Copay form of payment Credit card on file   Additional comments Copay $236.09   Questions or concerns for the pharmacist? No   Explain any questions or concerns for the pharmacist na   Are any medications first time fills? No           Medication Adherence     Other support network:            Follow-up: 90 Days    Zuleyma Rey PharmD, BCACP  Clinical Specialty Pharmacist, Endocrinology  1/3/2023  13:38 EST

## 2023-01-18 ENCOUNTER — SPECIALTY PHARMACY (OUTPATIENT)
Dept: ENDOCRINOLOGY | Facility: CLINIC | Age: 68
End: 2023-01-18
Payer: MEDICARE

## 2023-01-18 NOTE — PROGRESS NOTES
Specialty Pharmacy Patient Management Program  Endocrinology Refill Outreach      Diogenes is a 67 y.o. male contacted today regarding refills of his medication(s).    Specialty medication(s) and dose(s) confirmed: na  Other medications being refilled: Lipitor    Refill Questions    Flowsheet Row Most Recent Value   Changes to allergies? No   Changes to medications? No   New conditions since last clinic visit No   Unplanned office visit, urgent care, ED, or hospital admission in the last 4 weeks  No   How does patient/caregiver feel medication is working? Very good   Financial problems or insurance changes  No   Since the previous refill, were any specialty medication doses or scheduled injections missed or delayed?  No   Does this patient require a clinical escalation to a pharmacist? No          Delivery Questions    Flowsheet Row Most Recent Value   Delivery method [employer]Ex   Delivery address correct? Yes   Delivery phone number [phone]   Preferred delivery time? Anytime   Number of medications in delivery 1   Medication being filled and delivered Lipitor   Is there any medication that is due not being filled? No   Supplies needed? No supplies needed   Cooler needed? No   Do any medications need mixed or dated? No   Copay form of payment Credit card on file   Additional comments Copay $13.22   Questions or concerns for the pharmacist? No   Are any medications first time fills? No   Shipment status Delivery complete          Medication Adherence     Other support network:              Follow-up: 90 Days     Jess Lancaster, Care Coordinator   Endocrinology  1/18/2023  13:50 EST

## 2023-03-01 RX ORDER — FERROUS SULFATE 325(65) MG
1 TABLET ORAL DAILY
Qty: 90 TABLET | Refills: 3 | Status: CANCELLED | OUTPATIENT
Start: 2023-03-01

## 2023-03-01 RX ORDER — SEMAGLUTIDE 1.34 MG/ML
0.5 INJECTION, SOLUTION SUBCUTANEOUS WEEKLY
Qty: 4.5 ML | Refills: 4 | Status: SHIPPED | OUTPATIENT
Start: 2023-03-01 | End: 2023-03-28

## 2023-03-01 RX ORDER — ATORVASTATIN CALCIUM 20 MG/1
20 TABLET, FILM COATED ORAL DAILY
Qty: 90 TABLET | Refills: 3 | Status: SHIPPED | OUTPATIENT
Start: 2023-03-01

## 2023-03-01 RX ORDER — DAPAGLIFLOZIN 10 MG/1
10 TABLET, FILM COATED ORAL DAILY
Qty: 90 TABLET | Refills: 3 | Status: SHIPPED | OUTPATIENT
Start: 2023-03-01

## 2023-03-01 RX ORDER — INSULIN GLARGINE 300 U/ML
42 INJECTION, SOLUTION SUBCUTANEOUS NIGHTLY
Qty: 13.5 ML | Refills: 3 | Status: SHIPPED | OUTPATIENT
Start: 2023-03-01

## 2023-03-01 NOTE — TELEPHONE ENCOUNTER
Specialty Pharmacy Patient Management Program  Prescription Refill Request     Patient currently fills medications at  Pharmacy. Needing refill(s) on the following:      Requested Prescriptions     Pending Prescriptions Disp Refills   • atorvastatin (LIPITOR) 20 MG tablet 90 tablet 3     Sig: Take 1 tablet by mouth Daily.   • Farxiga 10 MG tablet 90 tablet 3     Sig: Take 10 mg by mouth Daily.   • ferrous sulfate 325 (65 FE) MG tablet 90 tablet 3     Sig: Take 1 tablet by mouth Daily.   • metFORMIN (GLUCOPHAGE) 500 MG tablet 360 tablet 3     Sig: Take 2 tablets by mouth 2 (Two) Times a Day With Meals.   • Ozempic, 0.25 or 0.5 MG/DOSE, 2 MG/1.5ML solution pen-injector 4.5 mL 4     Sig: Inject 0.5 mg under the skin into the appropriate area as directed 1 (One) Time Per Week.   • Toujeo SoloStar 300 UNIT/ML solution pen-injector injection 13.5 mL 3     Sig: Inject 42 Units under the skin into the appropriate area as directed Every Night.       Pended for Whitley Moser to review, and approve if appropriate.       Nazario Galdamez, PharmD, MPH  Clinical Specialty Pharmacist, Endocrinology  3/1/2023  08:45 EST

## 2023-03-02 ENCOUNTER — SPECIALTY PHARMACY (OUTPATIENT)
Dept: ENDOCRINOLOGY | Facility: CLINIC | Age: 68
End: 2023-03-02
Payer: MEDICARE

## 2023-03-02 NOTE — PROGRESS NOTES
Specialty Pharmacy Patient Management Program  Endocrinology Refill Outreach      Diogenes is a 67 y.o. male contacted today regarding refills of his medication(s).    Specialty medication(s) and dose(s) confirmed: NA  Other medications being refilled: Iron     Refill Questions    Flowsheet Row Most Recent Value   Changes to allergies? No   Changes to medications? No   New conditions since last clinic visit No   Unplanned office visit, urgent care, ED, or hospital admission in the last 4 weeks  No   How does patient/caregiver feel medication is working? Very good   Since the previous refill, were any specialty medication doses or scheduled injections missed or delayed?  No   Does this patient require a clinical escalation to a pharmacist? No          Delivery Questions    Flowsheet Row Most Recent Value   Delivery method [employer]Ex   Delivery phone number    Preferred delivery time? Anytime   Number of medications in delivery 1   Medication being filled and delivered Iron   Is there any medication that is due not being filled? No   Cooler needed? No   Do any medications need mixed or dated? No   Copay form of payment Credit card on file   Additional comments Copay $5.15   Questions or concerns for the pharmacist? No   Are any medications first time fills? No   Shipment status Delivery complete          Medication Adherence     Other support network:              Follow-up: 90 Days      Jess Lancaster, Care Coordinator   Endocrinology  3/2/2023  08:32 EST

## 2023-03-03 ENCOUNTER — OFFICE VISIT (OUTPATIENT)
Dept: ENDOCRINOLOGY | Facility: CLINIC | Age: 68
End: 2023-03-03
Payer: MEDICARE

## 2023-03-03 ENCOUNTER — SPECIALTY PHARMACY (OUTPATIENT)
Dept: ENDOCRINOLOGY | Facility: CLINIC | Age: 68
End: 2023-03-03
Payer: MEDICARE

## 2023-03-03 VITALS
HEART RATE: 79 BPM | OXYGEN SATURATION: 98 % | HEIGHT: 71 IN | DIASTOLIC BLOOD PRESSURE: 76 MMHG | SYSTOLIC BLOOD PRESSURE: 116 MMHG | BODY MASS INDEX: 24.64 KG/M2 | WEIGHT: 176 LBS

## 2023-03-03 DIAGNOSIS — E11.65 TYPE 2 DIABETES MELLITUS WITH HYPERGLYCEMIA, WITH LONG-TERM CURRENT USE OF INSULIN: Primary | Chronic | ICD-10-CM

## 2023-03-03 DIAGNOSIS — Z79.4 TYPE 2 DIABETES MELLITUS WITH HYPERGLYCEMIA, WITH LONG-TERM CURRENT USE OF INSULIN: Primary | Chronic | ICD-10-CM

## 2023-03-03 LAB
EXPIRATION DATE: NORMAL
EXPIRATION DATE: NORMAL
GLUCOSE BLDC GLUCOMTR-MCNC: 109 MG/DL (ref 70–130)
HBA1C MFR BLD: 7.1 %
Lab: NORMAL
Lab: NORMAL

## 2023-03-03 PROCEDURE — 99213 OFFICE O/P EST LOW 20 MIN: CPT | Performed by: PHYSICIAN ASSISTANT

## 2023-03-03 PROCEDURE — 3051F HG A1C>EQUAL 7.0%<8.0%: CPT | Performed by: PHYSICIAN ASSISTANT

## 2023-03-03 PROCEDURE — 83036 HEMOGLOBIN GLYCOSYLATED A1C: CPT | Performed by: PHYSICIAN ASSISTANT

## 2023-03-03 PROCEDURE — 82947 ASSAY GLUCOSE BLOOD QUANT: CPT | Performed by: PHYSICIAN ASSISTANT

## 2023-03-03 NOTE — PROGRESS NOTES
Specialty Pharmacy Patient Management Program  One-Time Clinical Outreach - MTP F/U     Diogenes Schneider is a 67 y.o. male with Type 2 Diabetes seen by an Endocrinology provider and enrolled in the Endocrinology Patient Management program offered by [facility].      A one-time clinical outreach was conducted on 3/3/2023 during appt to f/u on previously opened MTP.    9/20/2022 @1130 - Patient stated he was having severe stomach issues including nausea and dyspepsia while taking Ozempic, and therefore had stopped taking it altogether.  Asked if he would be interested in more information about potential solutions including supportive care measures (PPI or antiemetic).  Patient was not interested in supportive care or restarting therapy as he claims his BG readings have been WNL and his A1C is <7%.  Would prefer to stay off of Ozempic permenently and plans to discuss this with Whitley Moser at his next appt.    10/25/2022 @1200 - Patient stated he has since started back on Ozempic and plans to keep taking and tolerating at least until next f/u on     12/9 @1400.  Will reassess at that time.    1/3/2023: On reassessment, patient still tolerating Ozempic okay for now, feels manageable until next provider visit where risk/benefit and need for continuation can be discussed with provider. Will reschedule MTP follow-up for next appt and reassess at that time. KL    3/3/2023: No changes during today's appt per provider.  Pt now tolerating Ozempic very well and only has slight GI upset the day after dose and these symptoms quickly resolve.  MTP resolved.  ANIL Galdamez, PharmD, MPH  Clinical Specialty Pharmacist, Endocrinology  3/3/2023  12:16 EST

## 2023-03-03 NOTE — PROGRESS NOTES
Specialty Pharmacy Patient Management Program  Endocrinology Reassessment     Diogenes Schneider is a 67 y.o. male seen by an Endocrinology provider for Type 2 Diabetes and enrolled in the Endocrinology Patient Management program offered by [facility].  A follow-up outreach was conducted, including assessment of continued therapy appropriateness, medication adherence, and side effect incidence and management for Ozempic, Farxiga and Toujeo.    Changes to Insurance Coverage or Financial Support  No changes at this time    Relevant Past Medical History and Comorbidities  Relevant medical history and concomitant health conditions were discussed with the patient. The patient's chart has been reviewed for relevant past medical history and comorbid health conditions and updated as necessary.   Past Medical History:   Diagnosis Date   • Glaucoma    • Hypercholesterolemia    • Impotence    • Influenza vaccine needed    • Iron deficiency    • Long term current use of insulin (HCC)    • Type 2 diabetes mellitus (HCC)      Social History     Socioeconomic History   • Marital status:    Tobacco Use   • Smoking status: Never   • Smokeless tobacco: Never   Vaping Use   • Vaping Use: Never used   Substance and Sexual Activity   • Alcohol use: Not Currently     Comment: One beer per month   • Drug use: Never   • Sexual activity: Yes     Partners: Female     Birth control/protection: Post-menopausal       Problem list reviewed by Nazario Galdamez, PharmD on 3/3/2023 at 12:13 PM    Allergies  Known allergies and reactions were discussed with the patient. The patient's chart has been reviewed for allergy information and updated as necessary.   No Known Allergies    Allergies reviewed by Nazario Galdamez, PharmD on 3/3/2023 at 12:12 PM    Relevant Laboratory Values  A1C Last 3 Results    HGBA1C Last 3 Results 7/12/22 10/25/22 3/3/23   Hemoglobin A1C 6.8 7.8 7.1           Lab Results   Component Value Date     HGBA1C 7.1 03/03/2023     Lab Results   Component Value Date    GLUCOSE 74 02/23/2021    CALCIUM 9.8 02/23/2021     02/23/2021    K 4.2 02/23/2021    CO2 24.2 02/23/2021     02/23/2021    BUN 10 02/23/2021    CREATININE 0.84 02/23/2021    EGFRIFNONA 92 02/23/2021    BCR 11.9 02/23/2021    ANIONGAP 12.8 02/23/2021     Lab Results   Component Value Date    CHOL 96 02/23/2021    TRIG 88 02/23/2021    HDL 40 02/23/2021    LDL 39 02/23/2021       Current Medication List  This medication list has been reviewed with the patient and evaluated for any interactions or necessary modifications/recommendations, and updated to include all prescription medications, OTC medications, and supplements the patient is currently taking.  This list reflects what is contained in the patient's profile, which has also been marked as reviewed to communicate to other providers it is the most up to date version of the patient's current medication therapy.     Current Outpatient Medications:   •  aspirin 81 MG EC tablet, Take 81 mg by mouth Daily., Disp: , Rfl:   •  atorvastatin (LIPITOR) 20 MG tablet, Take 1 tablet by mouth Daily., Disp: 90 tablet, Rfl: 3  •  Farxiga 10 MG tablet, Take 10 mg by mouth Daily., Disp: 90 tablet, Rfl: 3  •  ferrous sulfate 325 (65 FE) MG tablet, Take 1 tablet by mouth Daily., Disp: 90 tablet, Rfl: 0  •  metFORMIN (GLUCOPHAGE) 500 MG tablet, Take 2 tablets by mouth 2 (Two) Times a Day With Meals., Disp: 360 tablet, Rfl: 3  •  OneTouch Delica Lancets 33G misc, Use to test blood sugar once per day; E11.9, Disp: 100 each, Rfl: 5  •  OneTouch Verio test strip, Use to test blood sugar once per day; E11.9, Disp: 100 each, Rfl: 3  •  Ozempic, 0.25 or 0.5 MG/DOSE, 2 MG/1.5ML solution pen-injector, Inject 0.5 mg under the skin into the appropriate area as directed 1 (One) Time Per Week., Disp: 4.5 mL, Rfl: 4  •  Toujeo SoloStar 300 UNIT/ML solution pen-injector injection, Inject 42 Units under the skin into the  appropriate area as directed Every Night., Disp: 13.5 mL, Rfl: 3    Medicines reviewed by Nazario Galdamez, PharmD on 3/3/2023 at 12:12 PM    Drug Interactions  No Clinically Significant DDIs Were Identified at Present Time Upon Marking Medications Reviewed    Recommended Medications Assessment  • Aspirin: Currently Taking   • Statin: Currently Taking   • ACEi/ARB: Not Taking Currently    Adverse Drug Reactions  • Adverse Reactions Experienced: GI upset after Ozempic still lingers mildly, but much more improved than previously and quickly resolves within a day.  • Plan for ADR Management: Continue current measures    Hospitalizations and Urgent Care Since Last Assessment  • ED Visits, Admissions, or Hospitalizations: None  • Urgent Office Visits: None    Adherence, Self-Administration, and Current Therapy Problems  Adherence related to the patient's specialty therapy was discussed with the patient. The Adherence segment of this outreach has been reviewed and updated.     Adherence Questions  Medication(s) assessed: Farxiga, Ozempic, Toujeo  On average, how many doses/injections does the patient miss per month?: 0  What are the identified reasons for non-adherence or missed doses? : no problems identfied  What is the estimated medication adherence level?: %  Based on the patient/caregiver response and refill history, does this patient require an MTP to track adherence improvements?: no    • Ongoing or New Barriers to Patient Self-Administration: No known barriers at this time per pt  • Methods for Supporting Patient Self-Administration: Continue current measures.  MTP now resolved and pt reports 100% adherence.      Medication Therapy Recommendations  Type 2 diabetes mellitus without complication, with long-term current use of insulin (HCC)    Current Medication: Ozempic, 0.25 or 0.5 MG/DOSE, 2 MG/1.5ML solution pen-injector (Discontinued)   Rationale: Undesirable effect - Adverse medication event - Safety    Recommendation: Provide Education   Status: Accepted per Provider   Note: 9/20/2022 @1130 - Patient stated he was having severe stomach issues including nausea and dyspepsia while taking Ozempic, and therefore had stopped taking it altogether.  Asked if he would be interested in more information about potential solutions including supportive care measures (PPI or antiemetic).  Patient was not interested in supportive care or restarting therapy as he claims his BG readings have been WNL and his A1C is <7%.  Would prefer to stay off of Ozempic permenently and plans to discuss this with Whitley Moser at his next appt.  10/25/2022 @1200 - Patient stated he has since started back on Ozempic and plans to keep taking and tolerating at least until next f/u on 12/9 @1400.  Will reassess at that time.  1/3/2023: On reassessment, patient still tolerating Ozempic okay for now, feels manageable until next provider visit where risk/benefit and need for continuation can be discussed with provider. Will reschedule MTP follow-up for next appt and reassess at that time. KL  3/3/2023: No changes during today's appt per provider.  Pt now tolerating Ozempic very well and only has slight GI upset the day after dose and these symptoms quickly resolve.  MTP resolved.  CR             Goals of Therapy  Goals related to the patient's specialty therapy were discussed with the patient. The Patient Goals segment of this outreach has been reviewed and updated.   Goals     • Therapeutic/Clinical Goal      Maintain A1C Below 7%    3/30/22:  A1c in office today was 6.6% - patient adherent to medication therapies and understands importance of adherence. On track. KL     7/12/22: A1c in office today was 6.8% - patient adherent to medication therapies (%, no reported missed doses) and understands importance of adherence. On track, no current barriers to adherence or intervention required.  KL     1/3/22: No new values since last assessment,  patient appt canceled d/t provider being out of office.  Next appt is 3/3/23. Lab value will be obtained at that time and this goal will be reassessed. Patient endorses adherence to therapies, will manjinder as on track for today. KL     3/3/2023:  7.1%, which is improved from previous level taken.  On track.  CR            Quality of Life Assessment   Quality of Life related to the patient's enrollment in the patient management program and services provided was discussed with the patient. The QOL segment of this outreach has been reviewed and updated.    Quality of Life Improvement Scale: Moderately better    Reassessment Plan & Follow-Up  1. Medication Therapy Changes: No changes at this time.  Today's A1c was decreased from prior appt.   2. Additional Plans, Therapy Recommendations, or Therapy Problems to Be Addressed: None at this time.  Will f/u 3/28 for refill outreaches.  Pt progressing well on therapy.  3. Pharmacist to perform regular assessments no more than (6) months from the previous assessment.  4. Care Coordinator to set up future refill outreaches, coordinate prescription delivery, and escalate clinical questions to pharmacist.    Attestation  I attest that the specialty medications addressed above are appropriate for the patient based on my reassessment. If the prescribed therapy is at any point deemed not appropriate based on the current or future assessments, a consultation will be initiated with the patient's specialty care provider to determine the best course of action. The revised plan of therapy will be documented along with any required assessments and/or additional patient education provided.       Nazario Galdamez, PharmD, MPH  Clinical Specialty Pharmacist, Endocrinology  3/3/2023  12:20 EST

## 2023-03-03 NOTE — PROGRESS NOTES
"     Office Note      Date: 2023  Patient Name: Diogenes Schneider  MRN: [id number]  : 1955    Chief Complaint   Patient presents with   • Diabetes       History of Present Illness  Diogenes Schneider is a 67 y.o. male who presents today for follow up on type 2 diabtes.   Diabetes was diagnosed in .  Current diabetic medications: Toujeo, Ozempic, Farxiga, metformin.  Past diabetic medications: Glipizide.  He reports that he is tolerating the Ozempic 0.5 mg weekly okay.  Appetite okay.  He reports \"a touch of diarrhea the next morning\" after injection.  He denies any other GI symptoms.  He was testing 1-2 times per day, but decreased to once every 2-3 days since FSBG were staying at goal.  Fasting readings typically 110-120 range.  He denies any hypoglycemia.  Feet: No sores or current issues.  Foot exam today.  He reports feet cold, mainly notices in winter.  He reports lateral 2 toes on left foot feel the coldest.  Last eye exam: 3/8/2022. Mild NPDR.  ACE inhibitor/ARB: Not indicated.  Statin: Atorvastatin.    Review of systems  Negative except as noted in HPI.    Past Medical History:   Diagnosis Date   • Glaucoma    • Hypercholesterolemia    • Impotence    • Influenza vaccine needed    • Iron deficiency    • Long term current use of insulin (HCC)    • Type 2 diabetes mellitus (HCC)       Past Surgical History:   Procedure Laterality Date   • COLONOSCOPY     • HERNIA REPAIR       The following portions of the patient's history were reviewed and updated as appropriate: allergies, current medications, past family history, past medical history, past social history, past surgical history and problem list.    Vitals:  /76   Pulse 79   Ht 180.3 cm (71\")   Wt 79.8 kg (176 lb)   SpO2 98%   BMI 24.55 kg/m²     Physical Exam  Vitals reviewed.   Constitutional:       General: He is not in acute distress.  Cardiovascular:      Pulses:           Dorsalis pedis pulses are 2+ on the right side and 2+ on the " left side.        Posterior tibial pulses are 2+ on the right side and 2+ on the left side.   Musculoskeletal:      Right foot: No deformity.      Left foot: No deformity.   Feet:      Right foot:      Protective Sensation: 8 sites tested. 8 sites sensed.      Skin integrity: Skin integrity normal.      Toenail Condition: Right toenails are normal.      Left foot:      Protective Sensation: 8 sites tested. 8 sites sensed.      Skin integrity: Skin integrity normal.      Toenail Condition: Left toenails are normal.      Comments: Diabetic Foot Exam Performed and Monofilament Test Performed  Neurological:      Mental Status: He is alert and oriented to person, place, and time.   Psychiatric:         Mood and Affect: Affect normal.       Labs/Imaging    Hemoglobin A1c  Lab Results   Component Value Date    HGBA1C 7.1 03/03/2023    HGBA1C 7.8 10/25/2022    HGBA1C 6.8 07/12/2022     Glucose  Lab Results   Component Value Date    POCGLU 109 03/03/2023       Current Outpatient Medications   Medication Instructions   • aspirin 81 mg, Oral, Daily   • atorvastatin (LIPITOR) 20 mg, Oral, Daily   • Farxiga 10 mg, Oral, Daily   • ferrous sulfate 325 mg, Oral, Daily   • metFORMIN (GLUCOPHAGE) 1,000 mg, Oral, 2 Times Daily With Meals   • OneTouch Delica Lancets 33G misc Use to test blood sugar once per day; E11.9   • OneTouch Verio test strip Use to test blood sugar once per day; E11.9   • Ozempic (0.25 or 0.5 MG/DOSE) 0.5 mg, Subcutaneous, Weekly   • Toujeo SoloStar 42 Units, Subcutaneous, Nightly       Assessment & Plan  1. Type 2 diabetes mellitus with hyperglycemia, with long-term current use of insulin ([facility])  Diabetes control has improved.  A1c down to 7.1% today.  Continue current dose of Toujeo, Ozempic, Farxiga, and metformin.  BP okay.  Encouraged regular exercise in addition to healthy dietary choices.  Continue statin and aspirin.  He will have labs with PCP later this month.  - POC Glucose, Blood  - POC Glycosylated  Hemoglobin (Hb A1C)      Return in about 3 months (around 6/3/2023) for next scheduled follow up. He was advised to contact the office with any interval questions or concerns.    JOSÉ MIGUEL Tamez  Endocrinology  03/03/2023

## 2023-03-28 ENCOUNTER — SPECIALTY PHARMACY (OUTPATIENT)
Dept: ENDOCRINOLOGY | Facility: CLINIC | Age: 68
End: 2023-03-28
Payer: MEDICARE

## 2023-03-28 RX ORDER — SEMAGLUTIDE 0.68 MG/ML
0.5 INJECTION, SOLUTION SUBCUTANEOUS WEEKLY
Qty: 9 ML | Refills: 3 | Status: SHIPPED | OUTPATIENT
Start: 2023-03-28

## 2023-03-28 NOTE — PROGRESS NOTES
Specialty Pharmacy Patient Management Program  Endocrinology Refill Outreach      Diogenes is a 67 y.o. male contacted today regarding refills of his medication(s).    Specialty medication(s) and dose(s) confirmed: Ozempic, Farxiga, Toujeo   Other medications being refilled: Metformin     Refill Questions    Flowsheet Row Most Recent Value   Changes to allergies? No   Changes to medications? No   New conditions since last clinic visit No   Unplanned office visit, urgent care, ED, or hospital admission in the last 4 weeks  No   How does patient/caregiver feel medication is working? Very good   Financial problems or insurance changes  No   Since the previous refill, were any specialty medication doses or scheduled injections missed or delayed?  No   Does this patient require a clinical escalation to a pharmacist? No          Delivery Questions    Flowsheet Row Most Recent Value   Delivery method [employer]   Delivery address correct? Yes   Delivery phone number [phone]   Preferred delivery time? Anytime   Number of medications in delivery 4   Medication being filled and delivered Farxiga, Metformin, Ozempic, Toujeo   Is there any medication that is due not being filled? No   Supplies needed? No supplies needed   Cooler needed? Yes   Do any medications need mixed or dated? No   Copay form of payment Credit card on file   Additional comments Copay $236.09   Questions or concerns for the pharmacist? No   Are any medications first time fills? No   Shipment status Cooler packed, Delivery complete          Medication Adherence     Other support network:              Follow-up: 84 Days      Jess Lancaster, Care Coordinator   Endocrinology  3/28/2023  14:02 EDT

## 2023-03-28 NOTE — TELEPHONE ENCOUNTER
Specialty Pharmacy Patient Management Program  Prescription Refill Request     Patient currently fills medications at  Pharmacy. Needing refill(s) on the following:      Requested Prescriptions     Pending Prescriptions Disp Refills   • Semaglutide,0.25 or 0.5MG/DOS, (Ozempic, 0.25 or 0.5 MG/DOSE,) 2 MG/3ML solution pen-injector 9 mL 3     Sig: Inject 0.5 mg under the skin into the appropriate area as directed 1 (One) Time Per Week.       Pended for Whitley Moser to review, and approve if appropriate.     Zuleyma Rey, PharmD, BCACP  Clinical Specialty Pharmacist, Endocrinology  3/28/2023  11:59 EDT

## 2023-05-26 ENCOUNTER — SPECIALTY PHARMACY (OUTPATIENT)
Dept: ENDOCRINOLOGY | Facility: CLINIC | Age: 68
End: 2023-05-26
Payer: MEDICARE

## 2023-05-26 NOTE — PROGRESS NOTES
Specialty Pharmacy Patient Management Program  Endocrinology Refill Outreach      Diogenes is a 67 y.o. male contacted today regarding refills of his medication(s).    Specialty medication(s) and dose(s) confirmed: na  Other medications being refilled: Lipitor, Atorvastatin    Refill Questions    Flowsheet Row Most Recent Value   Changes to allergies? No   Changes to medications? No   New conditions since last clinic visit No   Unplanned office visit, urgent care, ED, or hospital admission in the last 4 weeks  No   How does patient/caregiver feel medication is working? Very good   Financial problems or insurance changes  No   Since the previous refill, were any specialty medication doses or scheduled injections missed or delayed?  No   Does this patient require a clinical escalation to a pharmacist? No          Delivery Questions    Flowsheet Row Most Recent Value   Delivery method [employer]Ex   Delivery address correct? Yes   Delivery phone number [phone]   Preferred delivery time? Anytime   Number of medications in delivery 2   Medication being filled and delivered Lipitor, Iron   Is there any medication that is due not being filled? No   Supplies needed? No supplies needed   Cooler needed? No   Do any medications need mixed or dated? No   Copay form of payment Credit card on file   Additional comments copay $20.83   Questions or concerns for the pharmacist? No   Are any medications first time fills? No   Shipment status Delivery complete          Medication Adherence     Other support network:              Follow-up: 90 Days      Jess Lancaster, Care Coordinator   Endocrinology  5/26/2023  12:55 EDT

## 2023-05-31 ENCOUNTER — OFFICE VISIT (OUTPATIENT)
Dept: ENDOCRINOLOGY | Facility: CLINIC | Age: 68
End: 2023-05-31

## 2023-05-31 ENCOUNTER — SPECIALTY PHARMACY (OUTPATIENT)
Dept: ENDOCRINOLOGY | Facility: CLINIC | Age: 68
End: 2023-05-31

## 2023-05-31 VITALS
DIASTOLIC BLOOD PRESSURE: 72 MMHG | SYSTOLIC BLOOD PRESSURE: 122 MMHG | HEART RATE: 79 BPM | TEMPERATURE: 97.3 F | OXYGEN SATURATION: 97 % | BODY MASS INDEX: 23.82 KG/M2 | WEIGHT: 170.8 LBS

## 2023-05-31 DIAGNOSIS — E78.00 PURE HYPERCHOLESTEROLEMIA: Chronic | ICD-10-CM

## 2023-05-31 DIAGNOSIS — E11.65 TYPE 2 DIABETES MELLITUS WITH HYPERGLYCEMIA, WITH LONG-TERM CURRENT USE OF INSULIN: Primary | Chronic | ICD-10-CM

## 2023-05-31 DIAGNOSIS — Z79.4 TYPE 2 DIABETES MELLITUS WITH HYPERGLYCEMIA, WITH LONG-TERM CURRENT USE OF INSULIN: Primary | Chronic | ICD-10-CM

## 2023-05-31 LAB
EXPIRATION DATE: NORMAL
HBA1C MFR BLD: 7.3 %
Lab: NORMAL

## 2023-05-31 NOTE — ASSESSMENT & PLAN NOTE
Diabetes is uncontrolled with A1c of 7.3% today.  This is up from 7.1% last visit.  Goal A1c <7%.  Encouraged to reduce milk to 1 serving per day (1 cup or 8 ounces).  Encouraged regular exercise.  We discussed changing Ozempic to different GLP-1 RA or Mounjaro.  He prefers to continue Ozempic for now.  Continue current doses of Toujeo, Ozempic, Farxiga, and metformin.  He will consider CGM.  Diabetes will be reassessed in 3 months.

## 2023-05-31 NOTE — ASSESSMENT & PLAN NOTE
He will continue atorvastatin along with diet and exercise.  Plan to review recent lipids when available.

## 2023-05-31 NOTE — PROGRESS NOTES
Office Note      Date: 2023  Patient Name: Diogenes Schneider  MRN: [id number]  : 1955    Chief Complaint   Patient presents with   • Diabetes       History of Present Illness  Diogenes Schneider is a 67 y.o. male who presents today for follow up on type 2 diabetes.   Diabetes was diagnosed in .  Known diabetic complications: none.  Current diabetic medications: Toujeo, Ozempic, Farxiga, and metformin.  Past diabetic medications: Sulfonylurea (glipizide).  He reports frequent loose stools, occasional diarrhea.  He denies nausea or vomiting.  He denies abdominal pain.  Colonoscopy up to date.  He is testing fasting BG a couple of times per week.  Readings typically 120-130 range.  He denies any hypoglycemia.  He drinks 2% milk daily.  He estimates about 12 ounces several times per day.  He reports that he has started to be more active.  No regular exercise.  Feet: No sores or new issues.  Foot exam up to date.  Last eye exam: 2023.  ACE inhibitor/ARB: Not indicated.  Statin: Atorvastatin.  He reports having physical exam and labs with PCP earlier this year.  He reports also having labs today for follow up on iron level.  Requested copy of results for review.    Review of systems  As noted in HPI.    Past Medical History:   Diagnosis Date   • Glaucoma    • Hypercholesterolemia    • Impotence    • Influenza vaccine needed    • Iron deficiency    • Long term current use of insulin    • Type 2 diabetes mellitus       Past Surgical History:   Procedure Laterality Date   • COLONOSCOPY     • HERNIA REPAIR       The following portions of the patient's history were reviewed and updated as appropriate: allergies, current medications, past family history, past medical history, past social history, past surgical history and problem list.    Vitals:  /72 (BP Location: Right arm)   Pulse 79   Temp 97.3 °F (36.3 °C) (Temporal)   Wt 77.5 kg (170 lb 12.8 oz)   SpO2 97%   BMI 23.82 kg/m²     Physical  Exam  Vitals reviewed.   Constitutional:       General: He is not in acute distress.  Neurological:      Mental Status: He is alert and oriented to person, place, and time.   Psychiatric:         Mood and Affect: Affect normal.       Labs/Imaging    Hemoglobin A1c  Lab Results   Component Value Date    HGBA1C 7.3 05/31/2023    HGBA1C 7.1 03/03/2023    HGBA1C 7.8 10/25/2022       Current Outpatient Medications   Medication Instructions   • aspirin 81 mg, Oral, Daily   • atorvastatin (LIPITOR) 20 mg, Oral, Daily   • Farxiga 10 mg, Oral, Daily   • ferrous sulfate 325 mg, Oral, Daily   • metFORMIN (GLUCOPHAGE) 1,000 mg, Oral, 2 Times Daily With Meals   • OneTouch Delica Lancets 33G misc Use to test blood sugar once per day; E11.9   • OneTouch Verio test strip Use to test blood sugar once per day; E11.9   • Ozempic (0.25 or 0.5 MG/DOSE) 0.5 mg, Subcutaneous, Weekly   • Toujeo SoloStar 42 Units, Subcutaneous, Nightly       Assessment & Plan  Diagnoses and all orders for this visit:    1. Type 2 diabetes mellitus with hyperglycemia, with long-term current use of insulin (Primary)  Assessment & Plan:  Diabetes is uncontrolled with A1c of 7.3% today.  This is up from 7.1% last visit.  Goal A1c <7%.  Encouraged to reduce milk to 1 serving per day (1 cup or 8 ounces).  Encouraged regular exercise.  We discussed changing Ozempic to different GLP-1 RA or Mounjaro.  He prefers to continue Ozempic for now.  Continue current doses of Toujeo, Ozempic, Farxiga, and metformin.  He will consider CGM.  Diabetes will be reassessed in 3 months.    Orders:  -     POC Glycosylated Hemoglobin (Hb A1C)    2. Pure hypercholesterolemia  Assessment & Plan:  He will continue atorvastatin along with diet and exercise.  Plan to review recent lipids when available.        Return in about 3 months (around 8/31/2023) for next scheduled follow up. He was advised to contact the office with any interval questions or concerns.    Jody Moser,  PA  Endocrinology  05/31/2023

## 2023-05-31 NOTE — PROGRESS NOTES
Specialty Pharmacy Patient Management Program  Endocrinology Reassessment     Diogenes Schneider is a 67 y.o. male seen by an Endocrinology provider for Type 2 Diabetes and enrolled in the Endocrinology Patient Management program offered by [facility].  A follow-up outreach was conducted, including assessment of continued therapy appropriateness, medication adherence, and side effect incidence and management for Farxiga, Ozempic and Toujeo.    Changes to Insurance Coverage or Financial Support  None    Relevant Past Medical History and Comorbidities  Relevant medical history and concomitant health conditions were discussed with the patient. The patient's chart has been reviewed for relevant past medical history and comorbid health conditions and updated as necessary.   Past Medical History:   Diagnosis Date   • Glaucoma    • Hypercholesterolemia    • Impotence    • Influenza vaccine needed    • Iron deficiency    • Long term current use of insulin    • Type 2 diabetes mellitus      Social History     Socioeconomic History   • Marital status:    Tobacco Use   • Smoking status: Never   • Smokeless tobacco: Never   Vaping Use   • Vaping Use: Never used   Substance and Sexual Activity   • Alcohol use: Not Currently     Comment: One beer per month   • Drug use: Never   • Sexual activity: Yes     Partners: Female     Birth control/protection: Post-menopausal     Problem list reviewed by Zuleyma Rey PharmD on 5/31/2023 at  3:35 PM    Hospitalizations and Urgent Care Since Last Assessment  • ED Visits, Admissions, or Hospitalizations: None  • Urgent Office Visits: None    Allergies  Known allergies and reactions were discussed with the patient. The patient's chart has been reviewed for allergy information and updated as necessary.   No Known Allergies  Allergies reviewed by Zuleyma Rey PharmD on 5/31/2023 at  3:35 PM    Relevant Laboratory Values  A1C Last 3 Results        10/25/2022     11:18 3/3/2023    10:31 5/31/2023    14:14   HGBA1C Last 3 Results   Hemoglobin A1C 7.8   7.1   7.3       Lab Results   Component Value Date    HGBA1C 7.3 05/31/2023     Lab Results   Component Value Date    GLUCOSE 74 02/23/2021    CALCIUM 9.8 02/23/2021     02/23/2021    K 4.2 02/23/2021    CO2 24.2 02/23/2021     02/23/2021    BUN 10 02/23/2021    CREATININE 0.84 02/23/2021    EGFRIFNONA 92 02/23/2021    BCR 11.9 02/23/2021    ANIONGAP 12.8 02/23/2021     Lab Results   Component Value Date    CHOL 96 02/23/2021    TRIG 88 02/23/2021    HDL 40 02/23/2021    LDL 39 02/23/2021     Current Medication List  This medication list has been reviewed with the patient and evaluated for any interactions or necessary modifications/recommendations, and updated to include all prescription medications, OTC medications, and supplements the patient is currently taking.  This list reflects what is contained in the patient's profile, which has also been marked as reviewed to communicate to other providers it is the most up to date version of the patient's current medication therapy.     Current Outpatient Medications:   •  aspirin 81 MG EC tablet, Take 1 tablet by mouth Daily., Disp: , Rfl:   •  atorvastatin (LIPITOR) 20 MG tablet, Take 1 tablet by mouth Daily., Disp: 90 tablet, Rfl: 3  •  Farxiga 10 MG tablet, Take 10 mg by mouth Daily., Disp: 90 tablet, Rfl: 3  •  ferrous sulfate 325 (65 FE) MG tablet, Take 1 tablet by mouth Daily., Disp: 30 tablet, Rfl: 3  •  metFORMIN (GLUCOPHAGE) 500 MG tablet, Take 2 tablets by mouth 2 (Two) Times a Day With Meals., Disp: 360 tablet, Rfl: 3  •  OneTouch Delica Lancets 33G misc, Use to test blood sugar once per day; E11.9, Disp: 100 each, Rfl: 5  •  OneTouch Verio test strip, Use to test blood sugar once per day; E11.9, Disp: 100 each, Rfl: 3  •  Semaglutide,0.25 or 0.5MG/DOS, (Ozempic, 0.25 or 0.5 MG/DOSE,) 2 MG/3ML solution pen-injector, Inject 0.5 mg under the skin into the  appropriate area as directed 1 (One) Time Per Week., Disp: 9 mL, Rfl: 3  •  Toujeo SoloStar 300 UNIT/ML solution pen-injector injection, Inject 42 Units under the skin into the appropriate area as directed Every Night., Disp: 13.5 mL, Rfl: 3    Medicines reviewed by Zuleyma Rey, PharmD on 5/31/2023 at  3:35 PM    Drug Interactions  No Clinically Significant DDIs Were Identified at Present Time Upon Marking Medications Reviewed    Recommended Medications Assessment  • Aspirin: Currently Taking   • Statin: Currently Taking   • ACEi/ARB: Not Taking Currently    Adverse Drug Reactions  Medication tolerability: Tolerating with no to minimal ADRs  Medication plan: Continue therapy with normal follow-up  Plan for ADR Management: N/A    Adherence, Self-Administration, and Current Therapy Problems  Adherence related to the patient's specialty therapy was discussed with the patient. The Adherence segment of this outreach has been reviewed and updated.     Adherence Questions  Medication(s) assessed: Farxiga, Ozempic, Toujeo  On average, how many doses/injections does the patient miss per month?: 0  What are the identified reasons for non-adherence or missed doses? : no problems identfied  What is the estimated medication adherence level?: %  Based on the patient/caregiver response and refill history, does this patient require an MTP to track adherence improvements?: no    • Additional Barriers to Patient Self-Administration: None  • Methods for Supporting Patient Self-Administration: N/A     Medication Therapy Recommendations  No medication therapy recommendations to display    Goals of Therapy  Goals related to the patient's specialty therapy were discussed with the patient. The Patient Goals segment of this outreach has been reviewed and updated.   Goals     • Therapeutic/Clinical Goal      A1C Below 7%    3/30/22:  A1c in office today was 6.6% - patient adherent to medication therapies and understands importance  of adherence. On track. KL     7/12/22: A1c in office today was 6.8% - patient adherent to medication therapies (%, no reported missed doses) and understands importance of adherence. On track, no current barriers to adherence or intervention required.  KL     1/3/22: No new values since last assessment, patient appt canceled d/t provider being out of office.  Next appt is 3/3/23. Lab value will be obtained at that time and this goal will be reassessed. Patient endorses adherence to therapies, will manjinder as on track for today. KL     3/3/2023:  7.1%, which is improved from previous level taken.  On track.  CR    5/31/23: 7.3% Recent vacations affecting diet, relatively steady A1c, but not at goal. Patient is adherent to therapy, but does drink milk often. Pharmacist and provider provided recommendations on reducing sugar intake by cutting out some milk. No changes to specialty therapy. KL             Quality of Life Assessment   Quality of Life related to the patient's enrollment in the patient management program and services provided was discussed with the patient. The QOL segment of this outreach has been reviewed and updated.  Quality of Life Improvement Scale: Significantly better    Reassessment Plan & Follow-Up  1. Medication Therapy Changes: None   2. Related Plans, Therapy Recommendations, or Issues to Be Addressed: None  3. Pharmacist to perform regular assessments no more than (6) months from the previous assessment.  4. Care Coordinator to set up future refill outreaches, coordinate prescription delivery, and escalate clinical questions to pharmacist.    Attestation  Therapeutic appropriateness: Appropriate   If the prescribed therapy is at any point deemed not appropriate based on the current or future assessments, a consultation will be initiated with the patient's specialty care provider to determine the best course of action. The revised plan of therapy will be documented along with any required  assessments and/or additional patient education provided.     Zuleyma Rey, PharmD, BCACP  Clinical Specialty Pharmacist, Endocrinology  5/31/2023  15:41 EDT

## 2023-08-23 ENCOUNTER — OFFICE VISIT (OUTPATIENT)
Dept: ENDOCRINOLOGY | Facility: CLINIC | Age: 68
End: 2023-08-23
Payer: MEDICARE

## 2023-08-23 ENCOUNTER — TELEPHONE (OUTPATIENT)
Dept: ENDOCRINOLOGY | Facility: CLINIC | Age: 68
End: 2023-08-23

## 2023-08-23 ENCOUNTER — SPECIALTY PHARMACY (OUTPATIENT)
Dept: ENDOCRINOLOGY | Facility: CLINIC | Age: 68
End: 2023-08-23
Payer: MEDICARE

## 2023-08-23 VITALS
DIASTOLIC BLOOD PRESSURE: 70 MMHG | WEIGHT: 170 LBS | HEIGHT: 71 IN | BODY MASS INDEX: 23.8 KG/M2 | HEART RATE: 67 BPM | SYSTOLIC BLOOD PRESSURE: 118 MMHG | OXYGEN SATURATION: 97 %

## 2023-08-23 DIAGNOSIS — E11.65 TYPE 2 DIABETES MELLITUS WITH HYPERGLYCEMIA, WITH LONG-TERM CURRENT USE OF INSULIN: Primary | ICD-10-CM

## 2023-08-23 DIAGNOSIS — E78.00 PURE HYPERCHOLESTEROLEMIA: ICD-10-CM

## 2023-08-23 DIAGNOSIS — Z79.4 TYPE 2 DIABETES MELLITUS WITH HYPERGLYCEMIA, WITH LONG-TERM CURRENT USE OF INSULIN: Primary | ICD-10-CM

## 2023-08-23 LAB
EXPIRATION DATE: NORMAL
EXPIRATION DATE: NORMAL
GLUCOSE BLDC GLUCOMTR-MCNC: 93 MG/DL (ref 70–130)
HBA1C MFR BLD: 7 %
Lab: NORMAL
Lab: NORMAL

## 2023-08-23 PROCEDURE — 99214 OFFICE O/P EST MOD 30 MIN: CPT | Performed by: PHYSICIAN ASSISTANT

## 2023-08-23 PROCEDURE — 1159F MED LIST DOCD IN RCRD: CPT | Performed by: PHYSICIAN ASSISTANT

## 2023-08-23 PROCEDURE — 83036 HEMOGLOBIN GLYCOSYLATED A1C: CPT | Performed by: PHYSICIAN ASSISTANT

## 2023-08-23 PROCEDURE — 3051F HG A1C>EQUAL 7.0%<8.0%: CPT | Performed by: PHYSICIAN ASSISTANT

## 2023-08-23 PROCEDURE — 82947 ASSAY GLUCOSE BLOOD QUANT: CPT | Performed by: PHYSICIAN ASSISTANT

## 2023-08-23 PROCEDURE — 1160F RVW MEDS BY RX/DR IN RCRD: CPT | Performed by: PHYSICIAN ASSISTANT

## 2023-08-23 RX ORDER — SILDENAFIL 100 MG/1
100 TABLET, FILM COATED ORAL DAILY PRN
Qty: 30 TABLET | Refills: 3 | Status: SHIPPED | OUTPATIENT
Start: 2023-08-23

## 2023-08-23 NOTE — PROGRESS NOTES
"     Office Note      Date: 2023  Patient Name: Diogenes Schneider  MRN: [id number]  : 1955    Chief Complaint   Patient presents with    Diabetes     Type II       History of Present Illness:   Diogenes Schneider is a 68 y.o. male who presents for follow-up for type 2 diabetes diagnosed in .  He remains on Toujeo 42 units daily, Ozempic 0.5 mg weekly, Farxiga 10 mg daily and metformin 500 mg 2 tablets twice a day.  He reports he reduced his milk intake as Whitley suggested last visit in the last month has cut back on sugar and sweets.  He is excited his hemoglobin A1c is down today.  He checks his blood sugar a couple times a week and the lowest reading he has had has been 77 mg/dL.  He is up-to-date on his eye exam he had an appointment in July and goes every 6 months.  He denies any trouble with his feet today.  Whitley did his foot exam at his appointment in March.  He reports he had fasting labs with his primary care physician back in the spring.  We will try to get a copy of these results.      Subjective     Review of Systems:   Review of Systems   Constitutional: Negative.    Cardiovascular: Negative.    Gastrointestinal: Negative.    Endocrine: Negative.    Neurological: Negative.      The following portions of the patient's history were reviewed and updated as appropriate: allergies, current medications, past family history, past medical history, past social history, past surgical history, and problem list.    Objective     Vitals:    23 0949   BP: 118/70   BP Location: Left arm   Patient Position: Sitting   Cuff Size: Adult   Pulse: 67   SpO2: 97%   Weight: 77.1 kg (170 lb)   Height: 180.3 cm (71\")     Body mass index is 23.71 kg/mý.    Physical Exam  Vitals reviewed.   Constitutional:       General: He is not in acute distress.     Appearance: Normal appearance.   Neurological:      Mental Status: He is alert.       HEMOGLOBIN A1C  Lab Results   Component Value Date    HGBA1C 7.0 2023 "       GLUCOSE  Glucose   Date Value Ref Range Status   08/23/2023 93 70 - 130 mg/dL Final         Assessment / Plan      Assessment & Plan:  1. Type 2 diabetes mellitus with hyperglycemia, with long-term current use of insulin  His hemoglobin A1c today has improved at 7.0%.  He denies any hypoglycemia.  For now we will continue Toujeo 42 units daily, Ozempic 0.5 mg weekly, Farxiga 10 mg daily and metformin 500 mg 2 tablets twice a day.  I encouraged continued healthy eating habits and physical activity as tolerated.  We discussed that if he starts to have any trouble with hypoglycemia we may need to consider reducing his Toujeo.  He requested a prescription for sildenafil today and I sent this prescription.  His weight is stable.  His blood pressure is excellent.  - POC Glucose, Blood  - POC Glycosylated Hemoglobin (Hb A1C)    2. Pure hypercholesterolemia  He reports his primary care physician checked cholesterol labs back in the spring.  We will try to reach out to them for a copy of these results.  For now he will continue atorvastatin 20 mg daily.      Return in about 3 months (around 11/23/2023) for Recheck 3-4 mos.     This note was dictated using Dragon voice recognition.    Mendy Wheeler PA-C  08/23/2023

## 2023-08-23 NOTE — PROGRESS NOTES
Specialty Pharmacy Patient Management Program  Endocrinology Reassessment     Diogenes Schneider is a 68 y.o. male seen by an Endocrinology provider for Type 2 Diabetes and enrolled in the Endocrinology Patient Management program offered by [facility].  A follow-up outreach was conducted, including assessment of continued therapy appropriateness, medication adherence, and side effect incidence and management for Farxiga, Ozempic, and Toujeo.    Changes to Insurance Coverage or Financial Support  None, [employer] D     Relevant Past Medical History and Comorbidities  Relevant medical history and concomitant health conditions were discussed with the patient. The patient's chart has been reviewed for relevant past medical history and comorbid health conditions and updated as necessary.   Past Medical History:   Diagnosis Date    Glaucoma     Hypercholesterolemia     Impotence     Influenza vaccine needed     Iron deficiency     Long term current use of insulin     Type 2 diabetes mellitus      Social History     Socioeconomic History    Marital status:    Tobacco Use    Smoking status: Never    Smokeless tobacco: Never   Vaping Use    Vaping Use: Never used   Substance and Sexual Activity    Alcohol use: Not Currently     Comment: One beer per month    Drug use: Never    Sexual activity: Yes     Partners: Female     Birth control/protection: Post-menopausal     Problem list reviewed by Zuleyma Rey PharmD on 8/23/2023 at 11:55 AM    Hospitalizations and Urgent Care Since Last Assessment  ED Visits, Admissions, or Hospitalizations: None  Urgent Office Visits: None    Allergies  Known allergies and reactions were discussed with the patient. The patient's chart has been reviewed for allergy information and updated as necessary.   No Known Allergies  Allergies reviewed by Zuleyma Rey, Ryan on 8/23/2023 at 11:55 AM    Relevant Laboratory Values  A1C Last 3 Results          3/3/2023     10:31 5/31/2023    14:14 8/23/2023    09:57   HGBA1C Last 3 Results   Hemoglobin A1C 7.1  7.3  7.0      Lab Results   Component Value Date    HGBA1C 7.0 08/23/2023     Lab Results   Component Value Date    GLUCOSE 74 02/23/2021    CALCIUM 9.8 02/23/2021     02/23/2021    K 4.2 02/23/2021    CO2 24.2 02/23/2021     02/23/2021    BUN 10 02/23/2021    CREATININE 0.84 02/23/2021    EGFRIFNONA 92 02/23/2021    BCR 11.9 02/23/2021    ANIONGAP 12.8 02/23/2021     Lab Results   Component Value Date    CHOL 96 02/23/2021    TRIG 88 02/23/2021    HDL 40 02/23/2021    LDL 39 02/23/2021       Current Medication List  This medication list has been reviewed with the patient and evaluated for any interactions or necessary modifications/recommendations, and updated to include all prescription medications, OTC medications, and supplements the patient is currently taking.  This list reflects what is contained in the patient's profile, which has also been marked as reviewed to communicate to other providers it is the most up to date version of the patient's current medication therapy.     Current Outpatient Medications:     aspirin 81 MG EC tablet, Take 1 tablet by mouth Daily., Disp: , Rfl:     atorvastatin (LIPITOR) 20 MG tablet, Take 1 tablet by mouth Daily., Disp: 90 tablet, Rfl: 3    Farxiga 10 MG tablet, Take 10 mg by mouth Daily., Disp: 90 tablet, Rfl: 3    ferrous sulfate 325 (65 FE) MG tablet, Take 1 tablet by mouth Daily., Disp: 30 tablet, Rfl: 3    ferrous sulfate 325 (65 FE) MG tablet, Take 1 tablet by mouth Daily., Disp: 90 tablet, Rfl: 1    metFORMIN (GLUCOPHAGE) 500 MG tablet, Take 2 tablets by mouth 2 (Two) Times a Day With Meals., Disp: 360 tablet, Rfl: 3    OneTouch Delica Lancets 33G misc, Use to test blood sugar once per day; E11.9, Disp: 100 each, Rfl: 5    OneTouch Verio test strip, Use to test blood sugar once per day; E11.9, Disp: 100 each, Rfl: 3    Semaglutide,0.25 or 0.5MG/DOS, (Ozempic, 0.25 or  0.5 MG/DOSE,) 2 MG/3ML solution pen-injector, Inject 0.5 mg under the skin into the appropriate area as directed 1 (One) Time Per Week., Disp: 9 mL, Rfl: 3    sildenafil (VIAGRA) 100 MG tablet, Take 1 tablet by mouth As Needed for Erectile Dysfunction., Disp: 30 tablet, Rfl: 3    Toujeo SoloStar 300 UNIT/ML solution pen-injector injection, Inject 42 Units under the skin into the appropriate area as directed Every Night., Disp: 13.5 mL, Rfl: 3    Medicines reviewed by Zuleyma Rey, PharmD on 8/23/2023 at 11:55 AM    Drug Interactions  No Clinically Significant DDIs Were Identified at Present Time Upon Marking Medications Reviewed    Recommended Medications Assessment  Aspirin: Currently Taking   Statin: Currently Taking   ACEi/ARB: Not Taking Currently    Adverse Drug Reactions  Medication tolerability: Tolerating with no to minimal ADRs  Medication plan: Continue therapy with normal follow-up  Plan for ADR Management: N/A    Adherence, Self-Administration, and Current Therapy Problems  Adherence related to the patient's specialty therapy was discussed with the patient. The Adherence segment of this outreach has been reviewed and updated.     Adherence Questions  Medication(s) assessed: Farxiga, Ozempic, Toujeo  On average, how many doses/injections does the patient miss per month?: 0  What are the identified reasons for non-adherence or missed doses? : no problems identfied  What is the estimated medication adherence level?: %  Based on the patient/caregiver response and refill history, does this patient require an MTP to track adherence improvements?: no    Additional Barriers to Patient Self-Administration: None  Methods for Supporting Patient Self-Administration: N/A    Open Medication Therapy Problems  No medication therapy recommendations to display    Goals of Therapy  Goals related to the patient's specialty therapy were discussed with the patient. The Patient Goals segment of this outreach has been  reviewed and updated.   Goals        Therapeutic/Clinical Goal      A1C Below 7%    3/30/22:  A1c in office today was 6.6% - patient adherent to medication therapies and understands importance of adherence. On track. KL     7/12/22: A1c in office today was 6.8% - patient adherent to medication therapies (%, no reported missed doses) and understands importance of adherence. On track, no current barriers to adherence or intervention required.  KL     1/3/22: No new values since last assessment, patient appt canceled d/t provider being out of office.  Next appt is 3/3/23. Lab value will be obtained at that time and this goal will be reassessed. Patient endorses adherence to therapies, will manjinder as on track for today. KL     3/3/2023:  7.1%, which is improved from previous level taken.  On track.  CR    5/31/23: 7.3% Recent vacations affecting diet, relatively steady A1c, but not at goal. Patient is adherent to therapy, but does drink milk often. Pharmacist and provider provided recommendations on reducing sugar intake by cutting out some milk. No changes to specialty therapy. KL     8/23/23: 7.0% A1c improved, no changes at this time.                Quality of Life Assessment   Quality of Life related to the patient's enrollment in the patient management program and services provided was discussed with the patient. The QOL segment of this outreach has been reviewed and updated.  Quality of Life Improvement Scale: Significantly better    Reassessment Plan & Follow-Up  1. Medication Therapy Changes: None  2. Related Plans, Therapy Recommendations, or Issues to Be Addressed: None  3. Pharmacist to perform regular assessments no more than (6) months from the previous assessment.  4. Care Coordinator to set up future refill outreaches, coordinate prescription delivery, and escalate clinical questions to pharmacist.    Attestation  Therapeutic appropriateness: Appropriate   I attest the patient was actively involved  in and has agreed to the above plan of care.  If the prescribed therapy is at any point deemed not appropriate based on the current or future assessments, a consultation will be initiated with the patient's specialty care provider to determine the best course of action. The revised plan of therapy will be documented along with any required assessments and/or additional patient education provided.     Zuleyma Rey, PharmD, BCACP  Clinical Specialty Pharmacist, Endocrinology  8/23/2023  11:55 EDT

## 2023-09-14 ENCOUNTER — SPECIALTY PHARMACY (OUTPATIENT)
Dept: ENDOCRINOLOGY | Facility: CLINIC | Age: 68
End: 2023-09-14
Payer: MEDICARE

## 2023-09-14 NOTE — PROGRESS NOTES
Specialty Pharmacy Patient Management Program  Endocrinology Refill Outreach      Diogenes is a 68 y.o. male contacted today regarding refills of his medication(s).    Specialty medication(s) and dose(s) confirmed: Ozempic, Farxiga   Other medications being refilled: Metformin     Refill Questions      Flowsheet Row Most Recent Value   Changes to allergies? No   Changes to medications? No   New conditions since last clinic visit No   Unplanned office visit, urgent care, ED, or hospital admission in the last 4 weeks  No   How does patient/caregiver feel medication is working? Very good   Financial problems or insurance changes  No   Since the previous refill, were any specialty medication doses or scheduled injections missed or delayed?  No   Does this patient require a clinical escalation to a pharmacist? No            Delivery Questions      Flowsheet Row Most Recent Value   Delivery method [employer]   Delivery address correct? Yes   Delivery phone number [phone]   Preferred delivery time? Anytime   Number of medications in delivery 3   Medication being filled and delivered Farxiga, Metformin, Ozempic   Is there any medication that is due not being filled? No   Supplies needed? No supplies needed   Cooler needed? Yes   Do any medications need mixed or dated? No   Copay form of payment Credit card on file   Additional comments Copay $245.10   Questions or concerns for the pharmacist? No   Are any medications first time fills? No   Shipment status Cooler packed, Delivery complete            Medication Adherence       Other support network:                Follow-up: 84 Days, Ozempic, 90 Days, Farxiga, Metformin      Jess Lancaster, Care Coordinator   Endocrinology  9/14/2023  13:24 EDT

## 2023-10-10 ENCOUNTER — SPECIALTY PHARMACY (OUTPATIENT)
Dept: ENDOCRINOLOGY | Facility: CLINIC | Age: 68
End: 2023-10-10
Payer: MEDICARE

## 2023-10-10 NOTE — PROGRESS NOTES
Specialty Pharmacy Patient Management Program  Endocrinology Refill Outreach      Diogenes is a 68 y.o. male contacted today regarding refills of his medication(s).    Specialty medication(s) and dose(s) confirmed: Toujeo  Other medications being refilled: na    Refill Questions      Flowsheet Row Most Recent Value   Changes to allergies? No   Changes to medications? No   New conditions since last clinic visit No   Unplanned office visit, urgent care, ED, or hospital admission in the last 4 weeks  No   How does patient/caregiver feel medication is working? Very good   Financial problems or insurance changes  No   Since the previous refill, were any specialty medication doses or scheduled injections missed or delayed?  No   Does this patient require a clinical escalation to a pharmacist? No            Delivery Questions      Flowsheet Row Most Recent Value   Delivery method [employer]   Delivery address correct? Yes   Delivery phone number [phone]   Number of medications in delivery 1   Medication(s) being filled and delivered Insulin Glargine   Is there any medication that is due not being filled? No   Supplies needed? No supplies needed   Cooler needed? Yes   Do any medications need mixed or dated? No   Copay form of payment Credit card on file   Additional comments Copay $65.52   Questions or concerns for the pharmacist? No   Are any medications first time fills? No   Shipment status Cooler packed, Delivery complete            Medication Adherence                   Other support network:                Follow-up: 90 Days      Jess Lancaster, Care Coordinator   Endocrinology  10/10/2023  16:04 EDT

## 2023-11-30 ENCOUNTER — SPECIALTY PHARMACY (OUTPATIENT)
Dept: ENDOCRINOLOGY | Facility: CLINIC | Age: 68
End: 2023-11-30
Payer: MEDICARE

## 2023-11-30 RX ORDER — SILDENAFIL 100 MG/1
100 TABLET, FILM COATED ORAL DAILY PRN
Qty: 30 TABLET | Refills: 3 | Status: SHIPPED | OUTPATIENT
Start: 2023-11-30

## 2023-11-30 NOTE — PROGRESS NOTES
Specialty Pharmacy Patient Management Program  Endocrinology Refill Outreach      Diogenes is a 68 y.o. male contacted today regarding refills of his medication(s).    Specialty medication(s) and dose(s) confirmed: None  Other medication(s) being refilled: Atorvastatin, Ferrous Sulfate, Sildenafil    Refill Questions      Flowsheet Row Most Recent Value   Changes to allergies? No   Changes to medications? No   New conditions since last clinic visit No   Unplanned office visit, urgent care, ED, or hospital admission in the last 4 weeks  No   How does patient/caregiver feel medication is working? Very good   Financial problems or insurance changes  No   Since the previous refill, were any specialty medication doses or scheduled injections missed or delayed?  No   Does this patient require a clinical escalation to a pharmacist? No          Delivery Questions      Flowsheet Row Most Recent Value   Delivery method FedEx   Delivery address correct? Yes   Delivery phone number [phone]   Preferred delivery time? Anytime   Number of medications in delivery 3   Medication(s) being filled and delivered Sildenafil Citrate (Impotence Agents), Ferrous Sulfate, Atorvastatin Calcium (Hmg Coa Reductase Inhibitors)   Doses left of specialty medications Farxiga Due Around 12/21. Patient states he has excess Ozempic and Toujeo from filling early several times. Pushing out 1.5 Months.   Is there any medication that is due not being filled? No   Supplies needed? No supplies needed   Cooler needed? No   Do any medications need mixed or dated? No   Copay form of payment Credit card on file   Additional comments Copay TBD $60-70  Estimate. Patient acknowledged. Will contact patient again if different from estimate.   Questions or concerns for the pharmacist? No   Are any medications first time fills? No            Medication Adherence                   Other support network:             Follow-Up: 90d    Zuleyma Rey, MisbahD,  SHAUNCP  Clinical Specialty Pharmacist, Endocrinology  11/30/2023  09:05 EST

## 2023-11-30 NOTE — TELEPHONE ENCOUNTER
I do not typically send 90 tablets of Viagra at a time.  I think I sent 30 for a 90 day supply last time. Does he get some kind of deal or lower price?  This just seems like a lot.  Let me know. Thanks RT

## 2023-11-30 NOTE — TELEPHONE ENCOUNTER
Specialty Pharmacy Patient Management Program  Prescription Refill Request     Patient currently fills medications at  Pharmacy. Needing refill(s) on the following:      Requested Prescriptions     Pending Prescriptions Disp Refills    sildenafil (VIAGRA) 100 MG tablet 90 tablet 3     Sig: Take 1 tablet by mouth once Daily As Needed for Erectile Dysfunction.     Pended for endocrinology provider, Mendy Wheeler PA-C, to review and approve if appropriate.     Misbah WickD, BCACP  Clinical Specialty Pharmacist, Endocrinology  11/30/2023  08:59 EST

## 2023-12-21 ENCOUNTER — SPECIALTY PHARMACY (OUTPATIENT)
Dept: ENDOCRINOLOGY | Facility: CLINIC | Age: 68
End: 2023-12-21
Payer: MEDICARE

## 2023-12-21 NOTE — PROGRESS NOTES
Specialty Pharmacy Patient Management Program  Endocrinology Refill Outreach      Diogenes is a 68 y.o. male contacted today regarding refills of his medication(s).    Specialty medication(s) and dose(s) confirmed: Farxiga  Other medication(s) being refilled: Metformin    Refill Questions      Flowsheet Row Most Recent Value   Changes to allergies? No   Changes to medications? No   New conditions or infections since last clinic visit No   Unplanned office visit, urgent care, ED, or hospital admission in the last 4 weeks  No   How does patient/caregiver feel medication is working? Very good   Financial problems or insurance changes  No   Since the previous refill, were any specialty medication doses or scheduled injections missed or delayed?  No   Does this patient require a clinical escalation to a pharmacist? No          Delivery Questions      Flowsheet Row Most Recent Value   Delivery method FedEx   Delivery address verified with patient/caregiver? Yes   Delivery address Home   Number of medications in delivery 2   Medication(s) being filled and delivered Metformin Hcl (Biguanides), Dapagliflozin Propanediol   Copay verified? Yes   Copay amount $94.89   Copay form of payment Credit/debit on file            Medication Adherence                   Other support network:             Follow-Up: 90d      Nazario Galdamez, PharmD, MPH  Clinical Specialty Pharmacist, Endocrinology  12/21/2023  13:56 EST

## 2024-01-08 ENCOUNTER — SPECIALTY PHARMACY (OUTPATIENT)
Dept: ENDOCRINOLOGY | Facility: CLINIC | Age: 69
End: 2024-01-08
Payer: MEDICARE

## 2024-01-08 RX ORDER — INSULIN GLARGINE 300 U/ML
42 INJECTION, SOLUTION SUBCUTANEOUS NIGHTLY
Qty: 13.5 ML | Refills: 3 | Status: SHIPPED | OUTPATIENT
Start: 2024-01-08

## 2024-01-08 NOTE — PROGRESS NOTES
Specialty Pharmacy Patient Management Program  Endocrinology Refill Outreach      Diogenes is a 68 y.o. male contacted today regarding refills of his medication(s).    Specialty medication(s) and dose(s) confirmed: Ozempic, Toujeo  Other medication(s) being refilled: None    Refill Questions      Flowsheet Row Most Recent Value   Changes to allergies? No   Changes to medications? No   New conditions or infections since last clinic visit No   How does patient/caregiver feel medication is working? Very good   Financial problems or insurance changes  No   Since the previous refill, were any specialty medication doses or scheduled injections missed or delayed?  No   Does this patient require a clinical escalation to a pharmacist? No          Delivery Questions      Flowsheet Row Most Recent Value   Delivery method FedEx   Delivery address verified with patient/caregiver? Yes   Delivery address Home   Number of medications in delivery 2   Medication(s) being filled and delivered Semaglutide, Insulin Glargine   Doses left of specialty medications Ozempic, Toujeo   Copay verified? Yes   Copay amount $120   Copay form of payment Credit/debit on file            Follow-Up: 84-90d    Zuleyma Rey, PharmD, BCACP  Clinical Specialty Pharmacist, Endocrinology  1/8/2024  10:30 EST    
140

## 2024-01-08 NOTE — TELEPHONE ENCOUNTER
Specialty Pharmacy Patient Management Program  Prescription Refill Request     Patient currently fills medications at  Pharmacy. Needing refill(s) on the following:      Requested Prescriptions     Pending Prescriptions Disp Refills    Toujeo SoloStar 300 UNIT/ML solution pen-injector injection 13.5 mL 3     Sig: Inject 42 Units under the skin into the appropriate area as directed Every Night.     Pended for endocrinology provider, Mendy Wheeler PA-C, to review and approve if appropriate.     Zuleyma Rey, PharmD, BCACP  Clinical Specialty Pharmacist, Endocrinology  1/8/2024  08:24 EST

## 2024-01-15 ENCOUNTER — SPECIALTY PHARMACY (OUTPATIENT)
Dept: ENDOCRINOLOGY | Facility: CLINIC | Age: 69
End: 2024-01-15
Payer: MEDICARE

## 2024-01-15 ENCOUNTER — OFFICE VISIT (OUTPATIENT)
Dept: ENDOCRINOLOGY | Facility: CLINIC | Age: 69
End: 2024-01-15
Payer: MEDICARE

## 2024-01-15 VITALS
HEIGHT: 71 IN | WEIGHT: 170.6 LBS | BODY MASS INDEX: 23.88 KG/M2 | SYSTOLIC BLOOD PRESSURE: 112 MMHG | HEART RATE: 70 BPM | DIASTOLIC BLOOD PRESSURE: 72 MMHG | OXYGEN SATURATION: 98 %

## 2024-01-15 DIAGNOSIS — E11.65 TYPE 2 DIABETES MELLITUS WITH HYPERGLYCEMIA, WITH LONG-TERM CURRENT USE OF INSULIN: Primary | ICD-10-CM

## 2024-01-15 DIAGNOSIS — Z79.4 TYPE 2 DIABETES MELLITUS WITH HYPERGLYCEMIA, WITH LONG-TERM CURRENT USE OF INSULIN: Primary | ICD-10-CM

## 2024-01-15 DIAGNOSIS — E78.00 PURE HYPERCHOLESTEROLEMIA: ICD-10-CM

## 2024-01-15 LAB
EXPIRATION DATE: ABNORMAL
EXPIRATION DATE: NORMAL
GLUCOSE BLDC GLUCOMTR-MCNC: 118 MG/DL (ref 70–130)
HBA1C MFR BLD: 7.6 % (ref 4.5–5.7)
Lab: ABNORMAL
Lab: NORMAL

## 2024-01-15 PROCEDURE — 83036 HEMOGLOBIN GLYCOSYLATED A1C: CPT | Performed by: PHYSICIAN ASSISTANT

## 2024-01-15 PROCEDURE — 3051F HG A1C>EQUAL 7.0%<8.0%: CPT | Performed by: PHYSICIAN ASSISTANT

## 2024-01-15 PROCEDURE — 99214 OFFICE O/P EST MOD 30 MIN: CPT | Performed by: PHYSICIAN ASSISTANT

## 2024-01-15 PROCEDURE — 82947 ASSAY GLUCOSE BLOOD QUANT: CPT | Performed by: PHYSICIAN ASSISTANT

## 2024-01-15 NOTE — PROGRESS NOTES
"     Office Note      Date: 01/15/2024  Patient Name: Diogenes Schneider  MRN: [id number]  : 1955    Chief Complaint   Patient presents with    Diabetes       History of Present Illness:   Diogenes Schneider is a 68 y.o. male who presents for follow-up for type 2 diabetes diagnosed in .  He reports overall he is doing well.  His fasting blood glucose readings are typically 120 to 130 mg/dL.  He denies any trouble with hypoglycemia.  He reports he did cheat some over the holidays and needs to get back on track.  He remains on Toujeo 42 units daily, Ozempic 0.5 mg weekly, Farxiga 10 mg daily and metformin 500 mg 2 tablets daily.  He reports he is up-to-date on his eye exam he has an appointment for his 6-month follow-up later this week.  He denies any trouble with his feet today we did his foot exam at his appointment in March.  He has labs completed typically with his primary care physician.  He is due for urine microalbumin/creatinine ratio and reports he has his annual physical coming up in about 2 weeks.      Subjective     Review of Systems:   Review of Systems   Constitutional: Negative.    Cardiovascular: Negative.    Gastrointestinal: Negative.    Endocrine: Negative.    Neurological: Negative.        The following portions of the patient's history were reviewed and updated as appropriate: allergies, current medications, past family history, past medical history, past social history, past surgical history, and problem list.    Objective     Vitals:    01/15/24 0944   BP: 112/72   Pulse: 70   SpO2: 98%   Weight: 77.4 kg (170 lb 9.6 oz)   Height: 180.3 cm (71\")     Body mass index is 23.79 kg/m².    Physical Exam  Vitals reviewed.   Constitutional:       General: He is not in acute distress.     Appearance: Normal appearance.   Neurological:      Mental Status: He is alert.         HEMOGLOBIN A1C  Lab Results   Component Value Date    HGBA1C 7.6 (A) 01/15/2024       GLUCOSE  Glucose   Date Value Ref Range " Status   01/15/2024 118 70 - 130 mg/dL Final             Assessment / Plan      Assessment & Plan:  1. Type 2 diabetes mellitus with hyperglycemia, with long-term current use of insulin  His hemoglobin A1c is up some as compared to August at 7.6%.  This is above goal.  We discussed considering increasing his Toujeo if his fasting readings become elevated.  For now he will continue Toujeo 42 units daily, Ozempic 0.5 mg weekly, Farxiga 10 mg daily and metformin 500 mg 2 tablets daily.  He will try to get back on track following the holidays.  His blood pressure is excellent today.  His weight is stable.  I encouraged healthy eating habits and physical activity as tolerated.  He is due for urine microalbumin/creatinine ratio and will have this completed with his primary care physician at his annual physical in 2 weeks.  He will have a copy of the labs sent here for review.  - POC Glycosylated Hemoglobin (Hb A1C)  - POC Glucose, Blood    2. Pure hypercholesterolemia  He will have fasting labs completed as part of his annual physical in 2 weeks with his primary care physician.  He will have a copy sent here for review.  For now he will continue atorvastatin 20 mg daily.      Return in about 4 months (around 5/15/2024) for Recheck.     This note was dictated using Dragon voice recognition.    Mendy Wheeler PA-C  01/15/2024

## 2024-01-15 NOTE — PROGRESS NOTES
Specialty Pharmacy Patient Management Program  Endocrinology Reassessment     Diogenes Schneider was referred by an Endocrinology provider to the Endocrinology Patient Management program offered by [facility] for Type 2 Diabetes. A follow-up outreach was conducted, including assessment of continued therapy appropriateness, medication adherence, and side effect incidence and management for Farxiga, Ozempic, and Toujeo.    Changes to Insurance Coverage or Financial Support  None    Relevant Past Medical History and Comorbidities  Relevant medical history and concomitant health conditions were discussed with the patient. The patient's chart has been reviewed for relevant past medical history and comorbid health conditions and updated as necessary.   Past Medical History:   Diagnosis Date    Glaucoma     Hypercholesterolemia     Impotence     Influenza vaccine needed     Iron deficiency     Long term current use of insulin     Type 2 diabetes mellitus      Social History     Socioeconomic History    Marital status:    Tobacco Use    Smoking status: Never    Smokeless tobacco: Never   Vaping Use    Vaping Use: Never used   Substance and Sexual Activity    Alcohol use: Not Currently     Comment: One beer per month    Drug use: Never    Sexual activity: Yes     Partners: Female     Birth control/protection: Post-menopausal     Problem list reviewed by Zuleyma Rey PharmD on 1/15/2024 at 10:49 AM    Hospitalizations and Urgent Care Since Last Assessment  ED Visits, Admissions, or Hospitalizations: None Reported or Noted in EHR  Urgent Office Visits: None Reported or Noted in EHR    Allergies  Known allergies and reactions were discussed with the patient. The patient's chart has been reviewed for allergy information and updated as necessary.   No Known Allergies  Allergies reviewed by Zuleyma Rey PharmD on 1/15/2024 at 10:49 AM    Relevant Laboratory Values  Relevant laboratory values  were discussed with the patient. The following specialty medication dose adjustment(s) are recommended: A1c slightly increased, no changes today. Suspect diet related, patient has fluctuated around goal historically. Lipids, BP, weight, medical history all normal. Do not feel benefits of increasing GLP-1 or insulin would outweigh risks at this time.  A1C Last 3 Results          5/31/2023    14:14 8/23/2023    09:57 1/15/2024    10:05   HGBA1C Last 3 Results   Hemoglobin A1C 7.3  7.0  7.6      Lab Results   Component Value Date    HGBA1C 7.6 (A) 01/15/2024     Lab Results   Component Value Date    GLUCOSE 74 02/23/2021    CALCIUM 9.8 02/23/2021     02/23/2021    K 4.2 02/23/2021    CO2 24.2 02/23/2021     02/23/2021    BUN 10 02/23/2021    CREATININE 0.84 02/23/2021    EGFRIFNONA 92 02/23/2021    BCR 11.9 02/23/2021    ANIONGAP 12.8 02/23/2021     Lab Results   Component Value Date    CHOL 96 02/23/2021    TRIG 88 02/23/2021    HDL 40 02/23/2021    LDL 39 02/23/2021       Current Medication List  This medication list has been reviewed with the patient and evaluated for any interactions or necessary modifications/recommendations, and updated to include all prescription medications, OTC medications, and supplements the patient is currently taking.  This list reflects what is contained in the patient's profile, which has also been marked as reviewed to communicate to other providers it is the most up to date version of the patient's current medication therapy.     Current Outpatient Medications:     aspirin 81 MG EC tablet, Take 1 tablet by mouth Daily., Disp: , Rfl:     atorvastatin (LIPITOR) 20 MG tablet, Take 1 tablet by mouth Daily., Disp: 90 tablet, Rfl: 3    Farxiga 10 MG tablet, Take 10 mg by mouth Daily., Disp: 90 tablet, Rfl: 3    ferrous sulfate 325 (65 FE) MG tablet, Take 1 tablet by mouth Daily., Disp: 30 tablet, Rfl: 3    ferrous sulfate 325 (65 FE) MG tablet, Take 1 tablet by mouth Daily.,  Disp: 90 tablet, Rfl: 1    metFORMIN (GLUCOPHAGE) 500 MG tablet, Take 2 tablets by mouth 2 (Two) Times a Day With Meals., Disp: 360 tablet, Rfl: 3    OneTouch Delica Lancets 33G misc, Use to test blood sugar once per day; E11.9, Disp: 100 each, Rfl: 5    OneTouch Verio test strip, Use to test blood sugar once per day; E11.9, Disp: 100 each, Rfl: 3    Semaglutide,0.25 or 0.5MG/DOS, (Ozempic, 0.25 or 0.5 MG/DOSE,) 2 MG/3ML solution pen-injector, Inject 0.5 mg under the skin into the appropriate area as directed 1 (One) Time Per Week., Disp: 9 mL, Rfl: 3    sildenafil (VIAGRA) 100 MG tablet, Take 1 tablet by mouth once Daily As Needed for Erectile Dysfunction., Disp: 30 tablet, Rfl: 3    Toujeo SoloStar 300 UNIT/ML solution pen-injector injection, Inject 42 Units under the skin into the appropriate area as directed Every Night., Disp: 13.5 mL, Rfl: 3    Medicines reviewed by Zuleyma Rey, PharmD on 1/15/2024 at 10:49 AM    Drug Interactions  No Clinically Significant DDIs Were Identified at Present Time Upon Marking Medications Reviewed    Recommended Medications Assessment  Aspirin: Currently Taking   Statin: Currently Taking   ACEi/ARB: Not Taking Currently    Adverse Drug Reactions  Medication tolerability: Tolerating with no to minimal ADRs  Medication plan: Continue therapy with normal follow-up  Plan for ADR Management: N/A    Adherence, Self-Administration, and Current Therapy Problems  Adherence related to the patient's specialty therapy was discussed with the patient. The Adherence segment of this outreach has been reviewed and updated.     Adherence Questions  Linked Medication(s) Assessed: Dapagliflozin Propanediol, Insulin Glargine, Semaglutide  On average, how many doses/injections does the patient miss per month?: 0  What are the identified reasons for non-adherence or missed doses? : no problems identified  What is the estimated medication adherence level?: %  Based on the patient/caregiver  response and refill history, does this patient require an MTP to track adherence improvements?: no    Additional Barriers to Patient Self-Administration: None  Methods for Supporting Patient Self-Administration: N/A    Open Medication Therapy Problems  No medication therapy recommendations to display    Goals of Therapy  Goals related to the patient's specialty therapy were discussed with the patient. The Patient Goals segment of this outreach has been reviewed and updated.   Goals Addressed Today        Therapeutic/Clinical Goal      A1C Below 7%    3/30/22:  A1c in office today was 6.6% - patient adherent to medication therapies and understands importance of adherence. On track. KL     7/12/22: A1c in office today was 6.8% - patient adherent to medication therapies (%, no reported missed doses) and understands importance of adherence. On track, no current barriers to adherence or intervention required.  KL     1/3/22: No new values since last assessment, patient appt canceled d/t provider being out of office.  Next appt is 3/3/23. Lab value will be obtained at that time and this goal will be reassessed. Patient endorses adherence to therapies, will manjinder as on track for today. KL     3/3/2023:  7.1%, which is improved from previous level taken.  On track.  CR    5/31/23: 7.3% Recent vacations affecting diet, relatively steady A1c, but not at goal. Patient is adherent to therapy, but does drink milk often. Pharmacist and provider provided recommendations on reducing sugar intake by cutting out some milk. No changes to specialty therapy. KL     8/23/23: 7.0% A1c improved, no changes at this time. KL     1/15/24: 7.6%  A1c slightly increased, no changes today. Suspect diet related, patient has fluctuated around goal historically. Lipids, BP, weight, medical history all normal. Do not feel benefits of increasing GLP-1 or insulin would outweigh risks at this time. KL               Quality of Life Assessment    Quality of Life related to the patient's enrollment in the patient management program and services provided was discussed with the patient. The QOL segment of this outreach has been reviewed and updated.  Quality of Life Improvement Scale: 9-A good deal better    Reassessment Plan & Follow-Up  1. Medication Therapy Changes: None  2. Related Plans, Therapy Recommendations, or Issues to Be Addressed: None  3. Pharmacist to perform regular assessments no more than (6) months from the previous assessment.  4. Care Coordinator to set up future refill outreaches, coordinate prescription delivery, and escalate clinical questions to pharmacist.    Attestation  Therapeutic appropriateness: Appropriate   I attest the patient was actively involved in and has agreed to the above plan of care.  If the prescribed therapy is at any point deemed not appropriate based on the current or future assessments, a consultation will be initiated with the patient's specialty care provider to determine the best course of action. The revised plan of therapy will be documented along with any required assessments and/or additional patient education provided.     Zuleyma Rey, Ryan, BCACP  Clinical Specialty Pharmacist, Endocrinology  1/15/2024  10:49 EST

## 2024-02-27 ENCOUNTER — SPECIALTY PHARMACY (OUTPATIENT)
Dept: PHARMACY | Facility: HOSPITAL | Age: 69
End: 2024-02-27
Payer: MEDICARE

## 2024-02-27 NOTE — PROGRESS NOTES
Specialty Pharmacy Patient Management Program  Endocrinology Refill Outreach      Diogenes is a 68 y.o. male contacted today regarding refills of his medication(s).    Specialty medication(s) and dose(s) confirmed: None  Other medication(s) being refilled: Atorvastatin, Ferrous Sulfate    Refill Questions      Flowsheet Row Most Recent Value   Changes to allergies? No   Changes to medications? No   New conditions or infections since last clinic visit No   Unplanned office visit, urgent care, ED, or hospital admission in the last 4 weeks  No   How does patient/caregiver feel medication is working? Very good   Financial problems or insurance changes  No   Since the previous refill, were any specialty medication doses or scheduled injections missed or delayed?  No   Does this patient require a clinical escalation to a pharmacist? No          Delivery Questions      Flowsheet Row Most Recent Value   Delivery method FedEx   Delivery address verified with patient/caregiver? Yes   Delivery address Home   Number of medications in delivery 2   Medication(s) being filled and delivered Atorvastatin Calcium (Hmg Coa Reductase Inhibitors), Ferrous Sulfate   Copay verified? Yes   Copay amount $13.40   Copay form of payment Credit/debit on file            Follow-Up: 90d    Misbah WickD, BCACP  Clinical Specialty Pharmacist, Endocrinology  2/27/2024  10:12 EST

## 2024-03-22 ENCOUNTER — TELEPHONE (OUTPATIENT)
Dept: ENDOCRINOLOGY | Facility: CLINIC | Age: 69
End: 2024-03-22
Payer: MEDICARE

## 2024-03-22 RX ORDER — SEMAGLUTIDE 0.68 MG/ML
0.5 INJECTION, SOLUTION SUBCUTANEOUS WEEKLY
Qty: 9 ML | Refills: 3 | Status: SHIPPED | OUTPATIENT
Start: 2024-03-22

## 2024-03-22 RX ORDER — DAPAGLIFLOZIN 10 MG/1
10 TABLET, FILM COATED ORAL DAILY
Qty: 90 TABLET | Refills: 3 | Status: SHIPPED | OUTPATIENT
Start: 2024-03-22

## 2024-03-22 NOTE — TELEPHONE ENCOUNTER
Specialty Pharmacy Patient Management Program  Per Protocol Prescription Order/Refill     Patient currently fills medications at [facility] and is enrolled in an Endocrinology Patient Management Program.     Requested Prescriptions     Pending Prescriptions Disp Refills    Farxiga 10 MG tablet 90 tablet 3     Sig: Take 10 mg by mouth Daily.    Semaglutide,0.25 or 0.5MG/DOS, (Ozempic, 0.25 or 0.5 MG/DOSE,) 2 MG/3ML solution pen-injector 9 mL 3     Sig: Inject 0.5 mg under the skin into the appropriate area as directed 1 (One) Time Per Week.    metFORMIN (GLUCOPHAGE) 500 MG tablet 360 tablet 3     Sig: Take 2 tablets by mouth 2 (Two) Times a Day With Meals.     Prescription orders above were sent to the pharmacy per Collaborative Care Agreement Protocol.     Last Office Visit: 1/15/24  Next Office Visit: 5/9/24    Zuleyma Rey, PharmD, BCACP  Clinical Specialty Pharmacist, Endocrinology  3/22/2024  07:32 EDT

## 2024-03-28 ENCOUNTER — SPECIALTY PHARMACY (OUTPATIENT)
Dept: ENDOCRINOLOGY | Facility: CLINIC | Age: 69
End: 2024-03-28
Payer: MEDICARE

## 2024-03-28 NOTE — PROGRESS NOTES
Specialty Pharmacy Patient Management Program  Endocrinology Refill Outreach      Diogenes is a 68 y.o. male contacted today regarding refills of his medication(s).    Specialty medication(s) and dose(s) confirmed: Ozempic, Toujeo, Farxiga   Other medication(s) being refilled: Ferrous Sulfate, Metformin    Refill Questions      Flowsheet Row Most Recent Value   Changes to allergies? No   Changes to medications? No   New conditions or infections since last clinic visit No   Unplanned office visit, urgent care, ED, or hospital admission in the last 4 weeks  No   How does patient/caregiver feel medication is working? Very good   Financial problems or insurance changes  No   Since the previous refill, were any specialty medication doses or scheduled injections missed or delayed?  No   Does this patient require a clinical escalation to a pharmacist? No          Delivery Questions      Flowsheet Row Most Recent Value   Delivery method FedEx   Delivery address verified with patient/caregiver? Yes   Delivery address Home   Number of medications in delivery 5   Medication(s) being filled and delivered Dapagliflozin Propanediol, Metformin Hcl (Biguanides), Semaglutide, Insulin Glargine, Ferrous Sulfate   Doses left of specialty medications 5-7 Days   Copay verified? Yes   Copay amount $244   Copay form of payment Credit/debit on file            Follow-Up: 84-90d    Zuleyma Rey, PharmD, BCACP  Clinical Specialty Pharmacist, Endocrinology  3/28/2024  15:26 EDT

## 2024-05-09 ENCOUNTER — OFFICE VISIT (OUTPATIENT)
Dept: ENDOCRINOLOGY | Facility: CLINIC | Age: 69
End: 2024-05-09
Payer: MEDICARE

## 2024-05-09 ENCOUNTER — SPECIALTY PHARMACY (OUTPATIENT)
Dept: ENDOCRINOLOGY | Facility: CLINIC | Age: 69
End: 2024-05-09
Payer: MEDICARE

## 2024-05-09 VITALS
HEIGHT: 71 IN | SYSTOLIC BLOOD PRESSURE: 120 MMHG | DIASTOLIC BLOOD PRESSURE: 64 MMHG | HEART RATE: 74 BPM | BODY MASS INDEX: 23.38 KG/M2 | WEIGHT: 167 LBS | OXYGEN SATURATION: 98 %

## 2024-05-09 DIAGNOSIS — E11.65 TYPE 2 DIABETES MELLITUS WITH HYPERGLYCEMIA, WITH LONG-TERM CURRENT USE OF INSULIN: Primary | ICD-10-CM

## 2024-05-09 DIAGNOSIS — E78.00 PURE HYPERCHOLESTEROLEMIA: ICD-10-CM

## 2024-05-09 DIAGNOSIS — Z79.4 TYPE 2 DIABETES MELLITUS WITH HYPERGLYCEMIA, WITH LONG-TERM CURRENT USE OF INSULIN: Primary | ICD-10-CM

## 2024-05-09 LAB
EXPIRATION DATE: ABNORMAL
EXPIRATION DATE: ABNORMAL
GLUCOSE BLDC GLUCOMTR-MCNC: 133 MG/DL (ref 70–130)
HBA1C MFR BLD: 7.5 % (ref 4.5–5.7)
Lab: ABNORMAL
Lab: ABNORMAL

## 2024-05-09 RX ORDER — ATORVASTATIN CALCIUM 20 MG/1
20 TABLET, FILM COATED ORAL DAILY
Qty: 90 TABLET | Refills: 3 | Status: SHIPPED | OUTPATIENT
Start: 2024-05-09

## 2024-05-24 ENCOUNTER — SPECIALTY PHARMACY (OUTPATIENT)
Dept: ENDOCRINOLOGY | Facility: CLINIC | Age: 69
End: 2024-05-24
Payer: MEDICARE

## 2024-05-24 NOTE — PROGRESS NOTES
Specialty Pharmacy Patient Management Program  Endocrinology Refill Outreach      Diogenes is a 68 y.o. male contacted today regarding refills of his medication(s).    Specialty medication(s) and dose(s) confirmed: none    Refill Questions      Flowsheet Row Most Recent Value   Changes to allergies? No   Changes to medications? No   New conditions or infections since last clinic visit No   Unplanned office visit, urgent care, ED, or hospital admission in the last 4 weeks  No   How does patient/caregiver feel medication is working? Very good   Financial problems or insurance changes  No   Since the previous refill, were any specialty medication doses or scheduled injections missed or delayed?  No   Does this patient require a clinical escalation to a pharmacist? No          Delivery Questions      Flowsheet Row Most Recent Value   Delivery method [employer]Ex   Delivery address verified with patient/caregiver? Yes   Delivery address Home   Number of medications in delivery 1   Medication(s) being filled and delivered Atorvastatin Calcium (Hmg Coa Reductase Inhibitors)   Doses left of specialty medications 1month   Copay verified? Yes   Copay amount $5.79   Copay form of payment Credit/debit on file            Follow-Up: 90d    Chana Mcneil CPhT  Pharmacy Care Coordinator, Endocrinology  5/24/2024  14:35 EDT

## 2024-06-13 ENCOUNTER — SPECIALTY PHARMACY (OUTPATIENT)
Dept: ENDOCRINOLOGY | Facility: CLINIC | Age: 69
End: 2024-06-13
Payer: MEDICARE

## 2024-06-13 NOTE — PROGRESS NOTES
Specialty Pharmacy Patient Management Program  Endocrinology Refill Outreach      Diogenes is a 69 y.o. male contacted today regarding refills of his medication(s).    Specialty medication(s) and dose(s) confirmed: Ozempic, Farxiga, Toujeo    Refill Questions      Flowsheet Row Most Recent Value   Changes to allergies? No   Changes to medications? No   New conditions or infections since last clinic visit No   Unplanned office visit, urgent care, ED, or hospital admission in the last 4 weeks  No   How does patient/caregiver feel medication is working? Very good   Financial problems or insurance changes  No   Since the previous refill, were any specialty medication doses or scheduled injections missed or delayed?  No   Does this patient require a clinical escalation to a pharmacist? No          Delivery Questions      Flowsheet Row Most Recent Value   Delivery method FedEx   Delivery address verified with patient/caregiver? Yes   Delivery address Home   Number of medications in delivery 5   Medication(s) being filled and delivered Insulin Glargine, Ferrous Sulfate, Dapagliflozin Propanediol, Semaglutide, Metformin Hcl (Biguanides)   Doses left of specialty medications 1-1.5 Weeks   Copay verified? Yes   Copay amount $244.31   Copay form of payment Credit/debit on file            Follow-Up: 84-90d    Zuleyma Rey, PharmD, BCACP  Clinical Specialty Pharmacist, Endocrinology  6/13/2024  09:30 EDT

## 2024-09-10 ENCOUNTER — SPECIALTY PHARMACY (OUTPATIENT)
Age: 69
End: 2024-09-10
Payer: MEDICARE

## 2024-09-10 NOTE — PROGRESS NOTES
Specialty Pharmacy Patient Management Program  Endocrinology Refill Outreach      Diogenes is a 69 y.o. male contacted today regarding refills of his medication(s).    Specialty medication(s) and dose(s) confirmed: Farxiga 10mg, Ozempic 2mg/3ml, Toujeo SoloStar.    Refill Questions      Flowsheet Row Most Recent Value   Changes to allergies? No   Changes to medications? No   New conditions or infections since last clinic visit No   Unplanned office visit, urgent care, ED, or hospital admission in the last 4 weeks  No   How does patient/caregiver feel medication is working? Very good   Financial problems or insurance changes  No   Since the previous refill, were any specialty medication doses or scheduled injections missed or delayed?  No   Does this patient require a clinical escalation to a pharmacist? No          Delivery Questions      Flowsheet Row Most Recent Value   Delivery method UPS   Delivery address verified with patient/caregiver? Yes   Delivery address Home   Number of medications in delivery 5   Medication(s) being filled and delivered Metformin Hcl (Biguanides), Ferrous Sulfate, Insulin Glargine, Dapagliflozin Propanediol, Semaglutide   Doses left of specialty medications 1-1.5 Weeks   Copay verified? Yes   Copay amount $131.08   Copay form of payment Credit/debit on file   Ship Date 9/12/2024   Delivery Date 9/13/2024   Signature Required No            Follow-Up: demetrio Walters CPhT  Pharmacy Care Coordinator, Endocrinology  9/10/2024  10:26 EDT

## 2024-09-17 ENCOUNTER — OFFICE VISIT (OUTPATIENT)
Dept: ENDOCRINOLOGY | Facility: CLINIC | Age: 69
End: 2024-09-17
Payer: MEDICARE

## 2024-09-17 ENCOUNTER — SPECIALTY PHARMACY (OUTPATIENT)
Dept: ENDOCRINOLOGY | Facility: CLINIC | Age: 69
End: 2024-09-17
Payer: MEDICARE

## 2024-09-17 ENCOUNTER — TELEPHONE (OUTPATIENT)
Dept: ENDOCRINOLOGY | Facility: CLINIC | Age: 69
End: 2024-09-17

## 2024-09-17 VITALS
BODY MASS INDEX: 23.38 KG/M2 | OXYGEN SATURATION: 99 % | WEIGHT: 167 LBS | HEIGHT: 71 IN | HEART RATE: 75 BPM | DIASTOLIC BLOOD PRESSURE: 69 MMHG | SYSTOLIC BLOOD PRESSURE: 114 MMHG

## 2024-09-17 DIAGNOSIS — E11.65 TYPE 2 DIABETES MELLITUS WITH HYPERGLYCEMIA, WITH LONG-TERM CURRENT USE OF INSULIN: Primary | ICD-10-CM

## 2024-09-17 DIAGNOSIS — Z79.4 TYPE 2 DIABETES MELLITUS WITH HYPERGLYCEMIA, WITH LONG-TERM CURRENT USE OF INSULIN: Primary | ICD-10-CM

## 2024-09-17 LAB
EXPIRATION DATE: ABNORMAL
EXPIRATION DATE: NORMAL
GLUCOSE BLDC GLUCOMTR-MCNC: 92 MG/DL (ref 70–130)
HBA1C MFR BLD: 6.9 % (ref 4.5–5.7)
Lab: ABNORMAL
Lab: NORMAL

## 2024-09-17 PROCEDURE — 1159F MED LIST DOCD IN RCRD: CPT | Performed by: PHYSICIAN ASSISTANT

## 2024-09-17 PROCEDURE — 82043 UR ALBUMIN QUANTITATIVE: CPT | Performed by: PHYSICIAN ASSISTANT

## 2024-09-17 PROCEDURE — 82947 ASSAY GLUCOSE BLOOD QUANT: CPT | Performed by: PHYSICIAN ASSISTANT

## 2024-09-17 PROCEDURE — 83036 HEMOGLOBIN GLYCOSYLATED A1C: CPT | Performed by: PHYSICIAN ASSISTANT

## 2024-09-17 PROCEDURE — 1160F RVW MEDS BY RX/DR IN RCRD: CPT | Performed by: PHYSICIAN ASSISTANT

## 2024-09-17 PROCEDURE — 99213 OFFICE O/P EST LOW 20 MIN: CPT | Performed by: PHYSICIAN ASSISTANT

## 2024-09-17 PROCEDURE — 82570 ASSAY OF URINE CREATININE: CPT | Performed by: PHYSICIAN ASSISTANT

## 2024-09-17 PROCEDURE — 3044F HG A1C LEVEL LT 7.0%: CPT | Performed by: PHYSICIAN ASSISTANT

## 2024-09-18 LAB
ALBUMIN UR-MCNC: <1.2 MG/DL
CREAT UR-MCNC: 111.6 MG/DL
MICROALBUMIN/CREAT UR: NORMAL MG/G{CREAT}

## 2024-11-27 ENCOUNTER — SPECIALTY PHARMACY (OUTPATIENT)
Age: 69
End: 2024-11-27
Payer: MEDICARE

## 2024-11-27 NOTE — PROGRESS NOTES
Specialty Pharmacy Patient Management Program  Endocrinology Refill Outreach      Diogenes is a 69 y.o. male contacted today regarding refills of his medication(s).    Specialty medication(s) and dose(s) confirmed: Ozempic.    Refill Questions      Flowsheet Row Most Recent Value   Changes to allergies? No   Changes to medications? No   New conditions or infections since last clinic visit No   Unplanned office visit, urgent care, ED, or hospital admission in the last 4 weeks  No   How does patient/caregiver feel medication is working? Very good   Financial problems or insurance changes  No   Since the previous refill, were any specialty medication doses or scheduled injections missed or delayed?  No   Does this patient require a clinical escalation to a pharmacist? No          Delivery Questions      Flowsheet Row Most Recent Value   Delivery method FedEx   Delivery address verified with patient/caregiver? Yes   Delivery address Home   Number of medications in delivery 2   Medication(s) being filled and delivered Atorvastatin Calcium (LIPITOR), Semaglutide (Ozempic (0.25 or 0.5 MG/DOSE))   Doses left of specialty medications 1   Copay verified? Yes   Copay amount $0   Copay form of payment No copayment ($0)   Ship Date 12/2   Delivery Date 12/3   Signature Required No            Follow-Up: 84d    Jad Walters CPhT  Pharmacy Care Coordinator, Endocrinology  11/27/2024  10:44 EST

## 2024-12-03 ENCOUNTER — TELEPHONE (OUTPATIENT)
Dept: GENERAL RADIOLOGY | Facility: HOSPITAL | Age: 69
End: 2024-12-03
Payer: MEDICARE

## 2024-12-03 ENCOUNTER — SPECIALTY PHARMACY (OUTPATIENT)
Age: 69
End: 2024-12-03
Payer: MEDICARE

## 2024-12-03 RX ORDER — INSULIN GLARGINE 300 U/ML
42 INJECTION, SOLUTION SUBCUTANEOUS NIGHTLY
Qty: 13.5 ML | Refills: 3 | Status: SHIPPED | OUTPATIENT
Start: 2024-12-03

## 2024-12-03 NOTE — PROGRESS NOTES
Specialty Pharmacy Patient Management Program  Endocrinology Refill Outreach      Diogenes is a 69 y.o. male contacted today regarding refills of his medication(s).    Specialty medication(s) and dose(s) confirmed: Farxiga, Ozempic.    Refill Questions      Flowsheet Row Most Recent Value   Changes to allergies? No   Changes to medications? No   New conditions or infections since last clinic visit No   Unplanned office visit, urgent care, ED, or hospital admission in the last 4 weeks  No   How does patient/caregiver feel medication is working? Very good   Financial problems or insurance changes  No   Since the previous refill, were any specialty medication doses or scheduled injections missed or delayed?  No   Does this patient require a clinical escalation to a pharmacist? No          Delivery Questions      Flowsheet Row Most Recent Value   Delivery method FedEx   Delivery address verified with patient/caregiver? Yes   Delivery address Home   Number of medications in delivery 3   Medication(s) being filled and delivered Insulin Glargine (Toujeo SoloStar), metFORMIN HCl (GLUCOPHAGE), Dapagliflozin Propanediol (Farxiga)   Doses left of specialty medications 1   Copay verified? Yes   Copay amount $0   Copay form of payment No copayment ($0)   Ship Date 12/3   Delivery Date 12/4   Signature Required No            Follow-Up: miles Walters CPhT  Pharmacy Care Coordinator, Endocrinology  12/3/2024  12:57 EST

## 2024-12-03 NOTE — TELEPHONE ENCOUNTER
Specialty Pharmacy Patient Management Program  Per Protocol Prescription Order/Refill     Patient currently fills medications at [facility] and is enrolled in an Endocrinology Patient Management Program.     Requested Prescriptions     Pending Prescriptions Disp Refills    Toujeo SoloStar 300 UNIT/ML solution pen-injector injection 13.5 mL 3     Sig: Inject 42 Units under the skin into the appropriate area as directed Every Night.     Prescription orders above were sent to the pharmacy per Collaborative Care Agreement Protocol.     Last Office Visit: 09/17/2024  Next Office Visit: 01/21/2025      Chito Rivera Pharm.D.  Clinical Specialty Pharmacist, Endocrinology  12:46 EST 12/03/24

## 2025-01-21 ENCOUNTER — OFFICE VISIT (OUTPATIENT)
Dept: ENDOCRINOLOGY | Facility: CLINIC | Age: 70
End: 2025-01-21
Payer: MEDICARE

## 2025-01-21 ENCOUNTER — SPECIALTY PHARMACY (OUTPATIENT)
Dept: ENDOCRINOLOGY | Facility: CLINIC | Age: 70
End: 2025-01-21
Payer: MEDICARE

## 2025-01-21 VITALS
BODY MASS INDEX: 23.52 KG/M2 | HEIGHT: 71 IN | OXYGEN SATURATION: 97 % | SYSTOLIC BLOOD PRESSURE: 118 MMHG | DIASTOLIC BLOOD PRESSURE: 64 MMHG | HEART RATE: 70 BPM | WEIGHT: 168 LBS

## 2025-01-21 DIAGNOSIS — E11.65 TYPE 2 DIABETES MELLITUS WITH HYPERGLYCEMIA, WITH LONG-TERM CURRENT USE OF INSULIN: Primary | ICD-10-CM

## 2025-01-21 DIAGNOSIS — Z79.4 TYPE 2 DIABETES MELLITUS WITH HYPERGLYCEMIA, WITH LONG-TERM CURRENT USE OF INSULIN: Primary | ICD-10-CM

## 2025-01-21 LAB
EXPIRATION DATE: ABNORMAL
EXPIRATION DATE: ABNORMAL
GLUCOSE BLDC GLUCOMTR-MCNC: 194 MG/DL (ref 70–130)
HBA1C MFR BLD: 7.1 % (ref 4.5–5.7)
Lab: ABNORMAL
Lab: ABNORMAL

## 2025-01-21 PROCEDURE — 1160F RVW MEDS BY RX/DR IN RCRD: CPT | Performed by: PHYSICIAN ASSISTANT

## 2025-01-21 PROCEDURE — 1159F MED LIST DOCD IN RCRD: CPT | Performed by: PHYSICIAN ASSISTANT

## 2025-01-21 PROCEDURE — 3051F HG A1C>EQUAL 7.0%<8.0%: CPT | Performed by: PHYSICIAN ASSISTANT

## 2025-01-21 PROCEDURE — 99213 OFFICE O/P EST LOW 20 MIN: CPT | Performed by: PHYSICIAN ASSISTANT

## 2025-01-21 PROCEDURE — 83036 HEMOGLOBIN GLYCOSYLATED A1C: CPT | Performed by: PHYSICIAN ASSISTANT

## 2025-01-21 PROCEDURE — 82947 ASSAY GLUCOSE BLOOD QUANT: CPT | Performed by: PHYSICIAN ASSISTANT

## 2025-01-21 RX ORDER — BLOOD SUGAR DIAGNOSTIC
1 STRIP MISCELLANEOUS DAILY
Qty: 100 EACH | Refills: 3 | Status: SHIPPED | OUTPATIENT
Start: 2025-01-21

## 2025-01-21 NOTE — PROGRESS NOTES
"     Office Note      Date: 2025  Patient Name: Diogenes Schneider  MRN: [id number]  : 1955    Chief Complaint   Patient presents with    Diabetes       History of Present Illness:   Diogenes Schneider is a 69 y.o. male who presents for follow-up for type 2 diabetes diagnosed in .  He is accompanied by his wife today.  He remains on Toujeo 42 units daily, Ozempic 0.5 mg weekly, Farxiga 10 mg daily and metformin 500 mg 2 tablets twice a day.  His wife is here today because she is concerned he is not eating much.  They are wondering if this could be the Ozempic.  He reports overall his blood sugars have been good.  He is up-to-date on his eye exam he goes regularly every 6 months.  He has a physical coming up next week with his primary care physician and will have labs sent here for review.  We did his urine microalbumin/creatinine ratio in September.      Subjective     Review of Systems:   Review of Systems   Constitutional:  Positive for appetite change.   Cardiovascular: Negative.    Gastrointestinal: Negative.    Endocrine: Negative.    Neurological: Negative.        The following portions of the patient's history were reviewed and updated as appropriate: allergies, current medications, past family history, past medical history, past social history, past surgical history, and problem list.    Objective     Vitals:    25 1440   BP: 118/64   Pulse: 70   SpO2: 97%   Weight: 76.2 kg (168 lb)   Height: 180.3 cm (71\")     Body mass index is 23.43 kg/m².    Physical Exam  Vitals reviewed.   Constitutional:       General: He is not in acute distress.     Appearance: Normal appearance.   Neurological:      Mental Status: He is alert.         HEMOGLOBIN A1C  Lab Results   Component Value Date    HGBA1C 7.1 (A) 2025       GLUCOSE  Glucose   Date Value Ref Range Status   2025 194 (A) 70 - 130 mg/dL Final             URINE MICROALBUMIN/CREATININE RATIO  Microalbumin/Creatinine Ratio   Date Value Ref " Range Status   09/17/2024   Final     Comment:     Unable to calculate       Assessment / Plan      Assessment & Plan:  1. Type 2 diabetes mellitus with hyperglycemia, with long-term current use of insulin  His hemoglobin A1c is up some as compared to September at 7.1%.  This is slightly above goal.  We discussed considering stopping the Ozempic but discussed that he may need to add mealtime insulin off this medication.  We discussed small frequent meals to improve intake.  We also discussed reducing his Ozempic to 0.5 mg every 10 days instead of every 7 days to see if this helps.  He prefers to continue the Ozempic for now.  He will continue Toujeo 42 units daily and Farxiga 10 mg daily as well as metformin 500 mg 2 tablets twice a day.  I refilled his pen needles and test strips today.  He will reach out if he continues to have trouble with the Ozempic and we will consider either reducing his dose or stopping the medication.  His weight is up 1 pound since his appointment in September.  His blood pressure is excellent today.  - POC Glucose, Blood  - POC Glycosylated Hemoglobin (Hb A1C)      Return in about 4 months (around 5/21/2025) for Recheck.     This note was dictated using Dragon voice recognition.    Electronically signed by: JOSÉ MIGUEL Marcelino  01/21/2025

## 2025-01-21 NOTE — PROGRESS NOTES
Specialty Pharmacy Patient Management Program  Endocrinology Reassessment     Diogenes Schneider was referred by an Endocrinology provider to the Endocrinology Patient Management program offered by [facility] for Type 2 Diabetes. A follow-up outreach was conducted, including assessment of continued therapy appropriateness, medication adherence, and side effect incidence and management for Farxiga, Ozempic, and Toujeo.    Changes to Insurance Coverage or Financial Support  No changes.     Relevant Past Medical History and Comorbidities  Relevant medical history and concomitant health conditions were discussed with the patient. The patient's chart has been reviewed for relevant past medical history and comorbid health conditions and updated as necessary.   Past Medical History:   Diagnosis Date    Glaucoma     Hypercholesterolemia     Impotence     Influenza vaccine needed     Iron deficiency     Long term current use of insulin     Type 2 diabetes mellitus      Social History     Socioeconomic History    Marital status:    Tobacco Use    Smoking status: Never    Smokeless tobacco: Never   Vaping Use    Vaping status: Never Used   Substance and Sexual Activity    Alcohol use: Not Currently     Comment: One beer per month    Drug use: Never    Sexual activity: Yes     Partners: Female     Birth control/protection: Post-menopausal     Problem list reviewed by Bethany Medrano PharmD on 1/21/2025 at  3:24 PM    Hospitalizations and Urgent Care Since Last Assessment  ED Visits, Admissions, or Hospitalizations: None.  Urgent Office Visits: None.    Allergies  Known allergies and reactions were discussed with the patient. The patient's chart has been reviewed for allergy information and updated as necessary.   No Known Allergies  Allergies reviewed by Bethany Medrano PharmD on 1/21/2025 at  3:24 PM    Relevant Laboratory Values  Relevant laboratory values were discussed with the patient. The  following specialty medication dose adjustment(s) are recommended: None.  A1C Last 3 Results          5/9/2024    10:37 9/17/2024    11:07 1/21/2025    14:49   HGBA1C Last 3 Results   Hemoglobin A1C 7.5  6.9  7.1      Lab Results   Component Value Date    HGBA1C 7.1 (A) 01/21/2025     Lab Results   Component Value Date    GLUCOSE 74 02/23/2021    CALCIUM 9.8 02/23/2021     02/23/2021    K 4.2 02/23/2021    CO2 24.2 02/23/2021     02/23/2021    BUN 10 02/23/2021    CREATININE 0.84 02/23/2021    EGFRIFNONA 92 02/23/2021    BCR 11.9 02/23/2021    ANIONGAP 12.8 02/23/2021     Lab Results   Component Value Date    CHOL 96 02/23/2021    TRIG 88 02/23/2021    HDL 40 02/23/2021    LDL 39 02/23/2021     Microalbumin          9/17/2024    11:27   Microalbumin   Microalbumin, Urine <1.2      Current Medication List  This medication list has been reviewed with the patient and evaluated for any interactions or necessary modifications/recommendations, and updated to include all prescription medications, OTC medications, and supplements the patient is currently taking.  This list reflects what is contained in the patient's profile, which has also been marked as reviewed to communicate to other providers it is the most up to date version of the patient's current medication therapy.     Current Outpatient Medications:     aspirin 81 MG EC tablet, Take 1 tablet by mouth Daily., Disp: , Rfl:     atorvastatin (LIPITOR) 20 MG tablet, Take 1 tablet by mouth Daily., Disp: 90 tablet, Rfl: 3    Farxiga 10 MG tablet, Take 1 tablet (10 mg) by mouth Daily., Disp: 90 tablet, Rfl: 3    ferrous sulfate 325 (65 FE) MG tablet, Take 1 tablet by mouth Daily., Disp: 90 tablet, Rfl: 1    ferrous sulfate 325 (65 FE) MG tablet, Take 1 tablet by mouth Daily., Disp: 90 tablet, Rfl: 3    Insulin Pen Needle 31G X 5 MM misc, Use 1 daily with insulin injection, Disp: 100 each, Rfl: 3    metFORMIN (GLUCOPHAGE) 500 MG tablet, Take 2 tablets by mouth  2 (Two) Times a Day With Meals., Disp: 360 tablet, Rfl: 3    OneTouch Delica Lancets 33G misc, Use to test blood sugar once per day; E11.9, Disp: 100 each, Rfl: 5    OneTouch Verio test strip, Use to test blood sugar once daily., Disp: 100 each, Rfl: 3    Semaglutide,0.25 or 0.5MG/DOS, (Ozempic, 0.25 or 0.5 MG/DOSE,) 2 MG/3ML solution pen-injector, Inject 0.5 mg under the skin into the appropriate area as directed 1 (One) Time Per Week., Disp: 9 mL, Rfl: 3    sildenafil (VIAGRA) 100 MG tablet, Take 1 tablet by mouth once Daily As Needed for Erectile Dysfunction., Disp: 30 tablet, Rfl: 3    Toujeo SoloStar 300 UNIT/ML solution pen-injector injection, Inject 42 Units under the skin into the appropriate area as directed Every Night., Disp: 13.5 mL, Rfl: 3    Medicines reviewed by Bethany Medrano, PharmD on 1/21/2025 at  3:24 PM    Drug Interactions  No Clinically Significant DDIs Were Identified at Present Time Upon Marking Medications Reviewed      Recommended Medications Assessment  Aspirin: Currently Taking   Statin: Currently Taking   ACEi/ARB: Not Taking Currently    Adverse Drug Reactions  Medication tolerability: Tolerating with no to minimal ADRs  Medication plan: Continue therapy with normal follow-up  Plan for ADR Management: Stretch dosing for appetite    Adherence, Self-Administration, and Current Therapy Problems  Adherence related to the patient's specialty therapy was discussed with the patient. The Adherence segment of this outreach has been reviewed and updated.     Adherence Questions  Linked Medication(s) Assessed: Dapagliflozin Propanediol (Farxiga), Insulin Glargine (Toujeo SoloStar), Semaglutide (Ozempic (0.25 or 0.5 MG/DOSE))  On average, how many doses/injections does the patient miss per month?: 0  What is the estimated medication adherence level?: %  Based on the patient/caregiver response and refill history, does this patient require an MTP to track adherence improvements?:  no    Additional Barriers to Patient Self-Administration: None.  Methods for Supporting Patient Self-Administration: None.     Open Medication Therapy Problems  No medication therapy recommendations to display    Goals of Therapy  Goals related to the patient's specialty therapy were discussed with the patient. The Patient Goals segment of this outreach has been reviewed and updated.   Goals Addressed Today        Therapeutic/Clinical Goal      A1C Below 7%    3/30/22:  A1c in office today was 6.6% - patient adherent to medication therapies and understands importance of adherence. On track. KL     7/12/22: A1c in office today was 6.8% - patient adherent to medication therapies (%, no reported missed doses) and understands importance of adherence. On track, no current barriers to adherence or intervention required.  KL     1/3/22: No new values since last assessment, patient appt canceled d/t provider being out of office.  Next appt is 3/3/23. Lab value will be obtained at that time and this goal will be reassessed. Patient endorses adherence to therapies, will manjinder as on track for today. KL     3/3/2023:  7.1%, which is improved from previous level taken.  On track.  CR    5/31/23: 7.3% Recent vacations affecting diet, relatively steady A1c, but not at goal. Patient is adherent to therapy, but does drink milk often. Pharmacist and provider provided recommendations on reducing sugar intake by cutting out some milk. No changes to specialty therapy. KL     8/23/23: 7.0% A1c improved, no changes at this time. KL     1/15/24: 7.6%  A1c slightly increased, no changes today. Suspect diet related, patient has fluctuated around goal historically. Lipids, BP, weight, medical history all normal. Do not feel benefits of increasing GLP-1 or insulin would outweigh risks at this time. KL     5/9/24: 7.5% A1c slightly decreased dr talked to patient about taking metformin qhs to maybe help lower glucose. Patient talked about  making diet changes to help to continue to lower A1c. NB    9/17/2024  6.9% A1c.  Decreased.  Spoke with patient about continuing taking medication as prescribed.  Patient currently at goal. RS    1/21/25: A1c 7.1% today. Some issues with appetite, may try to stretch Ozempic dosing to see if this helps with his appetite. MS              Quality of Life Assessment   Quality of Life related to the patient's enrollment in the patient management program and services provided was discussed with the patient. The QOL segment of this outreach has been reviewed and updated.  Quality of Life Improvement Scale: 8-Moderately better    Reassessment Plan & Follow-Up  1. Medication Therapy Changes: Continue current regimen.   2. Related Plans, Therapy Recommendations, or Issues to Be Addressed: None.  3. Pharmacist to perform regular assessments no more than (6) months from the previous assessment.  4. Care Coordinator to set up future refill outreaches, coordinate prescription delivery, and escalate clinical questions to pharmacist.    Attestation  Therapeutic appropriateness: Appropriate   I attest the patient was actively involved in and has agreed to the above plan of care.  If the prescribed therapy is at any point deemed not appropriate based on the current or future assessments, a consultation will be initiated with the patient's specialty care provider to determine the best course of action. The revised plan of therapy will be documented along with any required assessments and/or additional patient education provided.     Bethany Medrano, PharmD, BCCCP, BCPS  Clinical Specialty Pharmacist, Endocrinology  1/21/2025  15:42 EST    Discussed the aforementioned information with the patient via In-Person.

## 2025-01-30 ENCOUNTER — TELEPHONE (OUTPATIENT)
Dept: GENERAL RADIOLOGY | Facility: HOSPITAL | Age: 70
End: 2025-01-30
Payer: MEDICARE

## 2025-01-30 RX ORDER — BLOOD-GLUCOSE METER
1 EACH MISCELLANEOUS DAILY
Qty: 1 KIT | Refills: 0 | Status: SHIPPED | OUTPATIENT
Start: 2025-01-30

## 2025-01-30 RX ORDER — LANCETS
EACH MISCELLANEOUS
Qty: 100 EACH | Refills: 12 | Status: SHIPPED | OUTPATIENT
Start: 2025-01-30 | End: 2026-01-30

## 2025-01-30 NOTE — TELEPHONE ENCOUNTER
Specialty Pharmacy Patient Management Program  Per Protocol Prescription Order/Refill     Patient currently fills medications at [facility] and is enrolled in an Endocrinology Patient Management Program.     Requested Prescriptions     Pending Prescriptions Disp Refills    Blood Glucose Monitoring Suppl (Accu-Chek Guide) w/Device kit 1 kit 0     Sig: Use 1 each Daily.    glucose blood (Accu-Chek Guide) test strip 50 each 12     Sig: Use as instructed to check blood sugar once daily    Accu-Chek Softclix Lancets lancets 100 each 12     Sig: Use as instructed to test blood sugar once daily     Prescription orders above were sent to the pharmacy per Collaborative Care Agreement Protocol.     Patient's insurance now requires Accu Chek.  Sent new prescriptions in via [facility].    Chito Rivera Pharm.D.  Clinical Specialty Pharmacist, Endocrinology  09:26 EST 01/30/25

## 2025-03-30 NOTE — PROGRESS NOTES
Specialty Pharmacy Patient Management Program  Endocrinology Refill Outreach      Diogenes is a 68 y.o. male contacted today regarding refills of his medication(s).    Specialty medication(s) and dose(s) confirmed: None  Other medication(s) being refilled: Atorvastatin, Sildenafil, Ferrous Sulfate    Refill Questions      Flowsheet Row Most Recent Value   Changes to allergies? No   Changes to medications? No   New conditions since last clinic visit No   Unplanned office visit, urgent care, ED, or hospital admission in the last 4 weeks  No   How does patient/caregiver feel medication is working? Very good   Financial problems or insurance changes  No   Since the previous refill, were any specialty medication doses or scheduled injections missed or delayed?  No   Does this patient require a clinical escalation to a pharmacist? No          Delivery Questions      Flowsheet Row Most Recent Value   Delivery method FedEx   Delivery address correct? Yes   Delivery phone number [phone]   Preferred delivery time? Anytime   Number of medications in delivery 3   Medication being filled and delivered Atorvastatin, Ferrous Sulfate, Sildenafil   Doses left of specialty medications N/A Next Due 9/18   Is there any medication that is due not being filled? No   Supplies needed? No supplies needed   Cooler needed? No   Do any medications need mixed or dated? No   Copay form of payment Credit card on file   Additional comments Copay $45.51   Questions or concerns for the pharmacist? No   Are any medications first time fills? No            Medication Adherence       Other support network:             Follow-Up: 90d    Zuleyma Rey PharmD, BCACP  Clinical Specialty Pharmacist, Endocrinology  8/23/2023  11:59 EDT     Fall Risk

## 2025-03-31 ENCOUNTER — TELEPHONE (OUTPATIENT)
Dept: GENERAL RADIOLOGY | Facility: HOSPITAL | Age: 70
End: 2025-03-31
Payer: MEDICARE

## 2025-03-31 ENCOUNTER — SPECIALTY PHARMACY (OUTPATIENT)
Dept: ENDOCRINOLOGY | Facility: CLINIC | Age: 70
End: 2025-03-31
Payer: MEDICARE

## 2025-03-31 RX ORDER — SEMAGLUTIDE 0.68 MG/ML
0.5 INJECTION, SOLUTION SUBCUTANEOUS WEEKLY
Qty: 9 ML | Refills: 3 | Status: SHIPPED | OUTPATIENT
Start: 2025-03-31

## 2025-03-31 RX ORDER — DAPAGLIFLOZIN 10 MG/1
10 TABLET, FILM COATED ORAL DAILY
Qty: 90 TABLET | Refills: 3 | Status: SHIPPED | OUTPATIENT
Start: 2025-03-31

## 2025-03-31 NOTE — TELEPHONE ENCOUNTER
Specialty Pharmacy Patient Management Program  Per Protocol Prescription Order/Refill     Patient currently fills medications at [facility] and is enrolled in an Endocrinology Patient Management Program.     Requested Prescriptions     Pending Prescriptions Disp Refills    metFORMIN (GLUCOPHAGE) 500 MG tablet 360 tablet 3     Sig: Take 2 tablets by mouth 2 (Two) Times a Day With Meals.    Semaglutide,0.25 or 0.5MG/DOS, (Ozempic, 0.25 or 0.5 MG/DOSE,) 2 MG/3ML solution pen-injector 9 mL 3     Sig: Inject 0.5 mg under the skin into the appropriate area as directed 1 (One) Time Per Week.    Farxiga 10 MG tablet 90 tablet 3     Sig: Take 1 tablet (10 mg) by mouth Daily.     Prescription orders above were sent to the pharmacy per Collaborative Care Agreement Protocol.     Patient needing refills.  Sent via [facility]      Chito Rivera Pharm.D.  Clinical Specialty Pharmacist, Endocrinology  08:52 EDT 03/31/25

## 2025-03-31 NOTE — PROGRESS NOTES
Specialty Pharmacy Patient Management Program  Endocrinology Refill Outreach      Diogenes is a 69 y.o. male contacted today regarding refills of his medication(s).    Specialty medication(s) and dose(s) confirmed: farxiga and ozempic    Refill Questions      Flowsheet Row Most Recent Value   Changes to allergies? No   Changes to medications? No   New conditions or infections since last clinic visit No   Unplanned office visit, urgent care, ED, or hospital admission in the last 4 weeks  No   How does patient/caregiver feel medication is working? Very good   Financial problems or insurance changes  No   Since the previous refill, were any specialty medication doses or scheduled injections missed or delayed?  No   Does this patient require a clinical escalation to a pharmacist? No          Delivery Questions      Flowsheet Row Most Recent Value   Delivery method UPS   Delivery address verified with patient/caregiver? Yes   Delivery address Home   Number of medications in delivery 4   Medication(s) being filled and delivered Dapagliflozin Propanediol (Farxiga), Semaglutide (Ozempic (0.25 or 0.5 MG/DOSE)), Atorvastatin Calcium (LIPITOR), metFORMIN HCl (GLUCOPHAGE)   Doses left of specialty medications 1   Copay verified? Yes   Copay amount 180.86   Copay form of payment Credit/debit on file   Delivery Date Selection 04/01/25   Signature Required No   Do you consent to receive electronic handouts?  Yes            Follow-Up: 84d    Chana Mcneil CPhT  Pharmacy Care Coordinator, Endocrinology  3/31/2025  09:02 EDT

## 2025-05-20 ENCOUNTER — SPECIALTY PHARMACY (OUTPATIENT)
Dept: ENDOCRINOLOGY | Facility: CLINIC | Age: 70
End: 2025-05-20
Payer: MEDICARE

## 2025-05-20 NOTE — PROGRESS NOTES
Specialty Pharmacy Patient Management Program  Refill Outreach     Diogenes was contacted today regarding refills of their medication(s). Toujeo    Refill Questions      Flowsheet Row Most Recent Value   Changes to allergies? No   Changes to medications? No   New conditions or infections since last clinic visit No   Unplanned office visit, urgent care, ED, or hospital admission in the last 4 weeks  No   How does patient/caregiver feel medication is working? Good   Financial problems or insurance changes  No   Since the previous refill, were any specialty medication doses or scheduled injections missed or delayed?  No   Does this patient require a clinical escalation to a pharmacist? No            Delivery Questions      Flowsheet Row Most Recent Value   Delivery method UPS   Delivery address verified with patient/caregiver? Yes   Delivery address Home   Number of medications in delivery 5   Medication(s) being filled and delivered Blood Glucose Monitoring Suppl (Accu-Chek Guide), Lancets (Accu-Chek Softclix Lancets), Insulin Pen Needle, Ferrous Sulfate, Insulin Glargine (Toujeo SoloStar)   Doses left of specialty medications 1   Copay verified? Yes   Copay amount $88.20   Copay form of payment Credit/debit on file   Delivery Date Selection 05/21/25   Signature Required No   Do you consent to receive electronic handouts?  Yes            Medication Adherence       Other support network:              Follow-up: 90 day(s)     Keshia Douglas, Pharmacy Technician  5/20/2025  10:00 EDT

## 2025-06-09 ENCOUNTER — SPECIALTY PHARMACY (OUTPATIENT)
Dept: ENDOCRINOLOGY | Facility: CLINIC | Age: 70
End: 2025-06-09
Payer: MEDICARE

## 2025-06-09 ENCOUNTER — TELEPHONE (OUTPATIENT)
Dept: ENDOCRINOLOGY | Facility: CLINIC | Age: 70
End: 2025-06-09

## 2025-06-09 ENCOUNTER — OFFICE VISIT (OUTPATIENT)
Dept: ENDOCRINOLOGY | Facility: CLINIC | Age: 70
End: 2025-06-09
Payer: MEDICARE

## 2025-06-09 VITALS
HEIGHT: 71 IN | SYSTOLIC BLOOD PRESSURE: 120 MMHG | WEIGHT: 163 LBS | OXYGEN SATURATION: 99 % | HEART RATE: 74 BPM | BODY MASS INDEX: 22.82 KG/M2 | DIASTOLIC BLOOD PRESSURE: 76 MMHG

## 2025-06-09 DIAGNOSIS — E11.65 TYPE 2 DIABETES MELLITUS WITH HYPERGLYCEMIA, WITH LONG-TERM CURRENT USE OF INSULIN: Primary | ICD-10-CM

## 2025-06-09 DIAGNOSIS — Z79.4 TYPE 2 DIABETES MELLITUS WITH HYPERGLYCEMIA, WITH LONG-TERM CURRENT USE OF INSULIN: Primary | ICD-10-CM

## 2025-06-09 LAB
EXPIRATION DATE: ABNORMAL
EXPIRATION DATE: NORMAL
GLUCOSE BLDC GLUCOMTR-MCNC: 112 MG/DL (ref 70–130)
HBA1C MFR BLD: 6.8 % (ref 4.5–5.7)
Lab: ABNORMAL
Lab: NORMAL

## 2025-06-09 PROCEDURE — 82947 ASSAY GLUCOSE BLOOD QUANT: CPT | Performed by: PHYSICIAN ASSISTANT

## 2025-06-09 PROCEDURE — 83036 HEMOGLOBIN GLYCOSYLATED A1C: CPT | Performed by: PHYSICIAN ASSISTANT

## 2025-06-09 PROCEDURE — 99213 OFFICE O/P EST LOW 20 MIN: CPT | Performed by: PHYSICIAN ASSISTANT

## 2025-06-09 PROCEDURE — 1160F RVW MEDS BY RX/DR IN RCRD: CPT | Performed by: PHYSICIAN ASSISTANT

## 2025-06-09 PROCEDURE — 1159F MED LIST DOCD IN RCRD: CPT | Performed by: PHYSICIAN ASSISTANT

## 2025-06-09 PROCEDURE — 3044F HG A1C LEVEL LT 7.0%: CPT | Performed by: PHYSICIAN ASSISTANT

## 2025-06-09 NOTE — TELEPHONE ENCOUNTER
Please try to get a copy of his most recent lab results from his PCP-- Dr. Alonzo Upton, thanks RT

## 2025-06-09 NOTE — PROGRESS NOTES
Specialty Pharmacy Patient Management Program  Endocrinology Reassessment     Diogenes Schneider was referred by an Endocrinology provider to the Endocrinology Patient Management program offered by [facility] for Type 2 Diabetes. A follow-up outreach was conducted, including assessment of continued therapy appropriateness, medication adherence, and side effect incidence and management for Farxiga, Ozempic, and Toujeo.    Changes to Insurance Coverage or Financial Support  None reported    Relevant Past Medical History and Comorbidities  Relevant medical history and concomitant health conditions were discussed with the patient. The patient's chart has been reviewed for relevant past medical history and comorbid health conditions and updated as necessary.   Past Medical History:   Diagnosis Date    Glaucoma     Hypercholesterolemia     Impotence     Influenza vaccine needed     Iron deficiency     Long term current use of insulin     Type 2 diabetes mellitus      Social History     Socioeconomic History    Marital status:    Tobacco Use    Smoking status: Never     Passive exposure: Never    Smokeless tobacco: Never   Vaping Use    Vaping status: Never Used   Substance and Sexual Activity    Alcohol use: Yes     Comment: One beer per month    Drug use: Never    Sexual activity: Yes     Partners: Female     Birth control/protection: Post-menopausal     Problem list reviewed by Beatrice Hines, Ryan on 6/9/2025 at  1:19 PM    Hospitalizations and Urgent Care Since Last Assessment  ED Visits, Admissions, or Hospitalizations: none reported  Urgent Office Visits: none reported    Allergies  Known allergies and reactions were discussed with the patient. The patient's chart has been reviewed for allergy information and updated as necessary.   No Known Allergies  Allergies reviewed by Beatrice Hines, PharmJOÃO on 6/9/2025 at  1:19 PM    Relevant Laboratory Values  Relevant laboratory values were  discussed with the patient. The following specialty medication dose adjustment(s) are recommended: Ozempic reduced to 0.25 mg subcut weekly  A1C Last 3 Results          9/17/2024    11:07 1/21/2025    14:49 6/9/2025    12:05   HGBA1C Last 3 Results   Hemoglobin A1C 6.9  7.1  6.8      Lab Results   Component Value Date    HGBA1C 6.8 (A) 06/09/2025     Lab Results   Component Value Date    GLUCOSE 74 02/23/2021    CALCIUM 9.8 02/23/2021     02/23/2021    K 4.2 02/23/2021    CO2 24.2 02/23/2021     02/23/2021    BUN 10 02/23/2021    CREATININE 0.84 02/23/2021    EGFRIFNONA 92 02/23/2021    BCR 11.9 02/23/2021    ANIONGAP 12.8 02/23/2021     Lab Results   Component Value Date    CHOL 96 02/23/2021    TRIG 88 02/23/2021    HDL 40 02/23/2021    LDL 39 02/23/2021     Microalbumin          9/17/2024    11:27   Microalbumin   Microalbumin, Urine <1.2      Current Medication List  This medication list has been reviewed with the patient and evaluated for any interactions or necessary modifications/recommendations, and updated to include all prescription medications, OTC medications, and supplements the patient is currently taking.  This list reflects what is contained in the patient's profile, which has also been marked as reviewed to communicate to other providers it is the most up to date version of the patient's current medication therapy.     Current Outpatient Medications:     Accu-Chek Softclix Lancets lancets, Use as instructed to test blood sugar once daily, Disp: 100 each, Rfl: 12    aspirin 81 MG EC tablet, Take 1 tablet by mouth Daily., Disp: , Rfl:     atorvastatin (LIPITOR) 20 MG tablet, Take 1 tablet by mouth Daily., Disp: 90 tablet, Rfl: 3    Blood Glucose Monitoring Suppl (Accu-Chek Guide) w/Device kit, Use 1 each Daily., Disp: 1 kit, Rfl: 0    Farxiga 10 MG tablet, Take 1 tablet (10 mg) by mouth Daily., Disp: 90 tablet, Rfl: 3    ferrous sulfate 325 (65 FE) MG tablet, Take 1 tablet by mouth Daily.  (Patient not taking: Reported on 6/9/2025), Disp: 90 tablet, Rfl: 1    ferrous sulfate 325 (65 FE) MG tablet, Take 1 tablet by mouth Daily., Disp: 90 tablet, Rfl: 3    glucose blood test strip, Use as instructed to check blood sugar once daily, Disp: 50 each, Rfl: 12    Insulin Pen Needle 31G X 5 MM misc, Use 1 daily with insulin injection, Disp: 100 each, Rfl: 3    metFORMIN (GLUCOPHAGE) 500 MG tablet, Take 2 tablets by mouth 2 (Two) Times a Day With Meals., Disp: 360 tablet, Rfl: 3    Semaglutide,0.25 or 0.5MG/DOS, (Ozempic, 0.25 or 0.5 MG/DOSE,) 2 MG/3ML solution pen-injector, Inject 0.5 mg under the skin into the appropriate area as directed 1 (One) Time Per Week., Disp: 9 mL, Rfl: 3    sildenafil (VIAGRA) 100 MG tablet, Take 1 tablet by mouth once Daily As Needed for Erectile Dysfunction., Disp: 30 tablet, Rfl: 3    Toujeo SoloStar 300 UNIT/ML solution pen-injector injection, Inject 42 Units under the skin into the appropriate area as directed Every Night., Disp: 13.5 mL, Rfl: 3    Medicines reviewed by Beatrice iHnes, PharmD on 6/9/2025 at  1:19 PM    Drug Interactions  No Clinically Significant DDIs Were Identified at Present Time Upon Marking Medications Reviewed      Recommended Medications Assessment  Aspirin: Currently Taking   Statin: Currently Taking   ACEi/ARB: Not Taking Currently    Adverse Drug Reactions  Medication tolerability: Patient reported common adverse drug reaction  Medication plan: Continue therapy with normal follow-up  Plan for ADR Management: Recommended to incorporate 25 to 30 grams of fiber daily into his diet if possible    Adherence, Self-Administration, and Current Therapy Problems  Adherence related to the patient's specialty therapy was discussed with the patient. The Adherence segment of this outreach has been reviewed and updated.     Adherence Questions  Linked Medication(s) Assessed: Dapagliflozin Propanediol (Farxiga), Insulin Glargine (Toujeo SoloStar), Semaglutide  (Ozempic (0.25 or 0.5 MG/DOSE))  On average, how many doses/injections does the patient miss per month?: 0  What are the identified reasons for non-adherence or missed doses? : no problems identified  What is the estimated medication adherence level?: (S) % (Toujeo PDC 87% falsely low as not taking MDD; Ozempic PDC 92% and Farxiga PDC 98%)  Based on the patient/caregiver response and refill history, does this patient require an MTP to track adherence improvements?: no    Additional Barriers to Patient Self-Administration: none indicated  Methods for Supporting Patient Self-Administration: no issues    Open Medication Therapy Problems  No medication therapy recommendations to display    Goals of Therapy  Goals related to the patient's specialty therapy were discussed with the patient. The Patient Goals segment of this outreach has been reviewed and updated.   Goals Addressed Today        Therapeutic/Clinical Goal      A1C Below 7%    3/30/22:  A1c in office today was 6.6% - patient adherent to medication therapies and understands importance of adherence. On track. KL     7/12/22: A1c in office today was 6.8% - patient adherent to medication therapies (%, no reported missed doses) and understands importance of adherence. On track, no current barriers to adherence or intervention required.  KL     1/3/22: No new values since last assessment, patient appt canceled d/t provider being out of office.  Next appt is 3/3/23. Lab value will be obtained at that time and this goal will be reassessed. Patient endorses adherence to therapies, will manjinder as on track for today. KL     3/3/2023:  7.1%, which is improved from previous level taken.  On track.  CR    5/31/23: 7.3% Recent vacations affecting diet, relatively steady A1c, but not at goal. Patient is adherent to therapy, but does drink milk often. Pharmacist and provider provided recommendations on reducing sugar intake by cutting out some milk. No changes to  "specialty therapy. KL     8/23/23: 7.0% A1c improved, no changes at this time. KL     1/15/24: 7.6%  A1c slightly increased, no changes today. Suspect diet related, patient has fluctuated around goal historically. Lipids, BP, weight, medical history all normal. Do not feel benefits of increasing GLP-1 or insulin would outweigh risks at this time. KL     5/9/24: 7.5% A1c slightly decreased dr talked to patient about taking metformin qhs to maybe help lower glucose. Patient talked about making diet changes to help to continue to lower A1c. NB    9/17/2024  6.9% A1c.  Decreased.  Spoke with patient about continuing taking medication as prescribed.  Patient currently at goal. RS    1/21/25: A1c 7.1% today. Some issues with appetite, may try to stretch Ozempic dosing to see if this helps with his appetite. MS    A1C < 7 %     Lab Results    Component Value Date     HGBA1C 6.8 (A) 06/09/2025  Patient seen in clinic.  His A1c is down to 6.8%.  He has been taking Toujeo 42 units daily, Ozempic 0.5 mg weekly, Farxiga 10 mg daily and metformin 1000 mg BID.  He reports reduced appetite and that he has \"lost his dagoberto of food.\"  Ozempic reduced to 0.25 mg weekly.  He reports issues with constipation.  Discussed the importance of daily fiber intake, 25-30 grams daily for men age 51 and older, in addition to his daily protein intake.  Otherwise, doing well.  Will manjinder on track and follow up at next encounter. sdg    HGBA1C 7.1 (A) 01/21/2025                    Quality of Life Assessment   Quality of Life related to the patient's enrollment in the patient management program and services provided was discussed with the patient. The QOL segment of this outreach has been reviewed and updated.  Quality of Life Improvement Scale: 7-Somewhat better    Reassessment Plan & Follow-Up  1. Medication Therapy Changes: Ozempic reduced to 0.25 mg subcut weekly.  Encouraged him to incorporate fiber into his diet daily.  2. Related Plans, Therapy " Recommendations, or Issues to Be Addressed: Will follow up at next encounter  3. Pharmacist to perform regular assessments no more than (6) months from the previous assessment.  4. Care Coordinator to set up future refill outreaches, coordinate prescription delivery, and escalate clinical questions to pharmacist.    Attestation  Therapeutic appropriateness: Appropriate   I attest the patient was actively involved in and has agreed to the above plan of care.  If the prescribed therapy is at any point deemed not appropriate based on the current or future assessments, a consultation will be initiated with the patient's specialty care provider to determine the best course of action. The revised plan of therapy will be documented along with any required assessments and/or additional patient education provided.     Beatrice Hines, PharmD, LDE, CPT  Clinical Specialty Pharmacist, Endocrinology  6/9/2025  13:29 EDT

## 2025-06-09 NOTE — PROGRESS NOTES
"     Office Note      Date: 2025  Patient Name: Diogenes Schneider  MRN: [id number]  : 1955    Chief Complaint   Patient presents with    Diabetes     Type II Diabetes Mellitus with Hyperglycemia, with Long-Term Current Use of Insulin       History of Present Illness:   Diogenes Schneider is a 70 y.o. male who presents for follow-up for type 2 diabetes diagnosed in .  He remains on Toujeo 42 units daily, Ozempic 0.5 mg weekly, Farxiga 10 mg daily and metformin 500 mg 2 tablets twice a day.  Last visit we discussed reducing his Ozempic to 0.5 mg every 10 days.  He reports he did this briefly but has been taking it weekly recently.  He reports he does notice his appetite is low.  He is asking about reducing the Ozempic today.  He was happy to hear his hemoglobin A1c had improved.  He is up-to-date on his eye exam he goes annually in July.  His appointment is coming up.  He had fasting labs completed with his primary care physician since last visit.  We did his urine microalbumin/creatinine ratio in September.      Subjective     Review of Systems:   Review of Systems   Constitutional: Negative.    Cardiovascular: Negative.    Gastrointestinal: Negative.    Endocrine: Negative.    Neurological: Negative.        The following portions of the patient's history were reviewed and updated as appropriate: allergies, current medications, past family history, past medical history, past social history, past surgical history, and problem list.    Objective     Vitals:    25 1157   BP: 120/76   BP Location: Left arm   Patient Position: Sitting   Cuff Size: Adult   Pulse: 74   SpO2: 99%   Weight: 73.9 kg (163 lb)   Height: 180.3 cm (70.98\")     Body mass index is 22.74 kg/m².    Physical Exam  Vitals reviewed.   Constitutional:       General: He is not in acute distress.     Appearance: Normal appearance.   Neurological:      Mental Status: He is alert.         HEMOGLOBIN A1C  Lab Results   Component Value Date    " HGBA1C 6.8 (A) 06/09/2025       GLUCOSE  Glucose   Date Value Ref Range Status   06/09/2025 112 70 - 130 mg/dL Final           Assessment / Plan      Assessment & Plan:  1. Type 2 diabetes mellitus with hyperglycemia, with long-term current use of insulin  His hemoglobin A1c today has improved as compared to January at 6.8%.  I congratulated him on his efforts.  We will try reducing his Ozempic to the 0.25 mg weekly dose.  He will reach out if he has any trouble with blood glucose readings on the lower dose.  He will continue Toujeo 42 units daily, Farxiga 10 mg daily and metformin 500 mg 2 tablets twice a day.  His weight is down 5 pounds since his appointment in January.  I encouraged healthy eating habits and physical activity as tolerated.  Blood pressure is okay today.  We will plan to do his foot exam next visit for monitoring.  We will contact his primary care physician for a copy of his recent labs.  Patient to call as needed.  - POC Glucose, Blood  - POC Glycosylated Hemoglobin (Hb A1C)      Return in about 4 months (around 10/9/2025) for Recheck.     Electronically signed by: JOSÉ MIGUEL Marcelino  06/09/2025

## 2025-06-23 ENCOUNTER — SPECIALTY PHARMACY (OUTPATIENT)
Dept: ENDOCRINOLOGY | Facility: CLINIC | Age: 70
End: 2025-06-23
Payer: MEDICARE

## 2025-06-23 NOTE — PROGRESS NOTES
Specialty Pharmacy Patient Management Program  Refill Outreach     Diogenes was contacted today regarding refills of their medication(s). Farxiga and Ozempic     Refill Questions      Flowsheet Row Most Recent Value   Changes to allergies? No   Changes to medications? No   New conditions or infections since last clinic visit No   Unplanned office visit, urgent care, ED, or hospital admission in the last 4 weeks  No   How does patient/caregiver feel medication is working? Good   Financial problems or insurance changes  No   Since the previous refill, were any specialty medication doses or scheduled injections missed or delayed?  No   Does this patient require a clinical escalation to a pharmacist? No            Delivery Questions      Flowsheet Row Most Recent Value   Delivery method UPS   Delivery address verified with patient/caregiver? Yes   Delivery address Home   Number of medications in delivery 3   Medication(s) being filled and delivered Dapagliflozin Propanediol (Farxiga), metFORMIN HCl (GLUCOPHAGE), Semaglutide (Ozempic (0.25 or 0.5 MG/DOSE))   Doses left of specialty medications 1   Copay verified? Yes   Copay amount $50.00   Copay form of payment Credit/debit on file   Delivery Date Selection 06/24/25   Signature Required No   Do you consent to receive electronic handouts?  Yes            Medication Adherence       Other support network:              Follow-up: 84 day(s)     Keshia Douglas CPhT  Pharmacy Care Coordinator, Endocrinology  6/23/2025  09:44 EDT

## 2025-07-21 RX ORDER — ATORVASTATIN CALCIUM 20 MG/1
20 TABLET, FILM COATED ORAL DAILY
Qty: 90 TABLET | Refills: 3 | Status: CANCELLED | OUTPATIENT
Start: 2025-07-21

## 2025-08-12 ENCOUNTER — SPECIALTY PHARMACY (OUTPATIENT)
Dept: ENDOCRINOLOGY | Facility: CLINIC | Age: 70
End: 2025-08-12
Payer: MEDICARE